# Patient Record
Sex: FEMALE | Race: WHITE | NOT HISPANIC OR LATINO | Employment: FULL TIME | URBAN - METROPOLITAN AREA
[De-identification: names, ages, dates, MRNs, and addresses within clinical notes are randomized per-mention and may not be internally consistent; named-entity substitution may affect disease eponyms.]

---

## 2023-07-13 PROBLEM — F32.A DEPRESSION: Status: ACTIVE | Noted: 2023-07-13

## 2023-07-13 PROBLEM — G43.119 INTRACTABLE MIGRAINE WITH AURA WITHOUT STATUS MIGRAINOSUS: Status: ACTIVE | Noted: 2023-07-13

## 2023-07-13 PROBLEM — K21.9 GASTROESOPHAGEAL REFLUX DISEASE: Status: ACTIVE | Noted: 2023-07-13

## 2023-07-13 PROBLEM — N94.9 VAGINAL DISCOMFORT: Status: ACTIVE | Noted: 2023-07-13

## 2023-07-13 PROBLEM — E07.9 THYROID DYSFUNCTION: Status: ACTIVE | Noted: 2023-07-13

## 2023-07-13 PROBLEM — E03.9 HYPOTHYROIDISM: Status: ACTIVE | Noted: 2023-07-13

## 2023-07-13 PROBLEM — R10.84 ABDOMINAL CRAMPING, GENERALIZED: Status: ACTIVE | Noted: 2023-07-13

## 2023-07-13 PROBLEM — K59.00 CONSTIPATION: Status: ACTIVE | Noted: 2023-07-13

## 2023-07-13 PROBLEM — N94.6 DYSMENORRHEA: Status: ACTIVE | Noted: 2023-07-13

## 2023-07-13 PROBLEM — K59.09 CHRONIC CONSTIPATION: Status: ACTIVE | Noted: 2023-07-13

## 2023-07-13 PROBLEM — B34.9 NONSPECIFIC SYNDROME SUGGESTIVE OF VIRAL ILLNESS: Status: ACTIVE | Noted: 2023-07-13

## 2023-07-13 PROBLEM — F31.9 BIPOLAR DISORDER (MULTI): Status: ACTIVE | Noted: 2019-02-05

## 2023-07-13 PROBLEM — F31.9 BIPOLAR DEPRESSION (MULTI): Status: ACTIVE | Noted: 2023-07-13

## 2023-07-13 PROBLEM — N89.8 VAGINAL DISCHARGE: Status: ACTIVE | Noted: 2023-07-13

## 2023-07-13 PROBLEM — R00.2 PALPITATIONS: Status: ACTIVE | Noted: 2023-07-13

## 2023-07-13 PROBLEM — N89.8 VAGINAL IRRITATION: Status: ACTIVE | Noted: 2023-07-13

## 2023-07-13 PROBLEM — E06.9 THYROIDITIS: Status: ACTIVE | Noted: 2023-07-13

## 2023-07-13 PROBLEM — E05.90 HYPERTHYROIDISM: Status: ACTIVE | Noted: 2023-07-13

## 2023-09-05 PROBLEM — N64.52 NIPPLE DISCHARGE IN FEMALE: Status: ACTIVE | Noted: 2023-09-05

## 2023-09-05 PROBLEM — K59.04 CHRONIC IDIOPATHIC CONSTIPATION: Status: ACTIVE | Noted: 2023-09-05

## 2023-09-05 PROBLEM — N92.6 MENSES, IRREGULAR: Status: ACTIVE | Noted: 2023-09-05

## 2023-09-05 RX ORDER — MEDROXYPROGESTERONE ACETATE 10 MG/1
1 TABLET ORAL DAILY
COMMUNITY
Start: 2023-01-25 | End: 2023-10-27 | Stop reason: ALTCHOICE

## 2023-09-05 RX ORDER — OXCARBAZEPINE 300 MG/1
1 TABLET, FILM COATED ORAL 2 TIMES DAILY
COMMUNITY
Start: 2023-07-04 | End: 2023-10-27 | Stop reason: ALTCHOICE

## 2023-09-05 RX ORDER — POLYETHYLENE GLYCOL 3350 17 G/17G
17 POWDER, FOR SOLUTION ORAL 2 TIMES DAILY
COMMUNITY
Start: 2023-01-12 | End: 2023-10-27 | Stop reason: ALTCHOICE

## 2023-09-05 RX ORDER — HYDROXYZINE PAMOATE 25 MG/1
1 CAPSULE ORAL 2 TIMES DAILY PRN
COMMUNITY
Start: 2023-03-09 | End: 2023-10-27 | Stop reason: ALTCHOICE

## 2023-09-05 RX ORDER — BUSPIRONE HYDROCHLORIDE 15 MG/1
1 TABLET ORAL 2 TIMES DAILY
COMMUNITY
End: 2023-10-27 | Stop reason: ALTCHOICE

## 2023-09-05 RX ORDER — BUPROPION HYDROCHLORIDE 150 MG/1
1 TABLET ORAL EVERY MORNING
COMMUNITY
Start: 2023-07-04 | End: 2023-10-27 | Stop reason: ALTCHOICE

## 2023-09-05 RX ORDER — ASENAPINE 10 MG/1
2 TABLET SUBLINGUAL NIGHTLY
COMMUNITY
End: 2023-10-27

## 2023-09-05 RX ORDER — LEVOTHYROXINE SODIUM 50 UG/1
1 TABLET ORAL DAILY
COMMUNITY
End: 2023-12-14 | Stop reason: SDUPTHER

## 2023-09-05 RX ORDER — LITHIUM CARBONATE 300 MG/1
2 TABLET, FILM COATED, EXTENDED RELEASE ORAL NIGHTLY
COMMUNITY
Start: 2019-02-05 | End: 2023-10-27 | Stop reason: ALTCHOICE

## 2023-09-05 RX ORDER — DOCUSATE SODIUM 50 MG AND SENNOSIDES 8.6 MG 8.6; 5 MG/1; MG/1
2 TABLET, FILM COATED ORAL NIGHTLY
COMMUNITY
Start: 2023-04-16 | End: 2023-10-27 | Stop reason: ALTCHOICE

## 2023-09-05 RX ORDER — VENLAFAXINE HYDROCHLORIDE 150 MG/1
1 CAPSULE, EXTENDED RELEASE ORAL DAILY
COMMUNITY
End: 2023-10-27 | Stop reason: ALTCHOICE

## 2023-09-05 RX ORDER — ZIPRASIDONE HYDROCHLORIDE 80 MG/1
CAPSULE ORAL
COMMUNITY
Start: 2021-09-13 | End: 2023-10-27 | Stop reason: ALTCHOICE

## 2023-09-05 RX ORDER — LEVONORGESTREL 52 MG/1
INTRAUTERINE DEVICE INTRAUTERINE
COMMUNITY
End: 2023-10-27 | Stop reason: ALTCHOICE

## 2023-09-05 RX ORDER — LEVONORGESTREL/ETHIN.ESTRADIOL 0.1-0.02MG
TABLET ORAL
COMMUNITY
End: 2023-10-27 | Stop reason: ALTCHOICE

## 2023-09-05 RX ORDER — LITHIUM CARBONATE 450 MG/1
TABLET ORAL EVERY MORNING
COMMUNITY
Start: 2019-02-05 | End: 2023-10-27 | Stop reason: WASHOUT

## 2023-10-02 ENCOUNTER — APPOINTMENT (OUTPATIENT)
Dept: PRIMARY CARE | Facility: CLINIC | Age: 23
End: 2023-10-02
Payer: MEDICAID

## 2023-10-11 ENCOUNTER — APPOINTMENT (OUTPATIENT)
Dept: PRIMARY CARE | Facility: CLINIC | Age: 23
End: 2023-10-11
Payer: MEDICAID

## 2023-10-27 ENCOUNTER — OFFICE VISIT (OUTPATIENT)
Dept: PRIMARY CARE | Facility: CLINIC | Age: 23
End: 2023-10-27
Payer: MEDICAID

## 2023-10-27 VITALS
BODY MASS INDEX: 28.56 KG/M2 | SYSTOLIC BLOOD PRESSURE: 122 MMHG | HEIGHT: 65 IN | RESPIRATION RATE: 20 BRPM | HEART RATE: 75 BPM | OXYGEN SATURATION: 99 % | WEIGHT: 171.4 LBS | DIASTOLIC BLOOD PRESSURE: 85 MMHG | TEMPERATURE: 97.4 F

## 2023-10-27 DIAGNOSIS — J30.1 HAY FEVER: ICD-10-CM

## 2023-10-27 DIAGNOSIS — Z13.220 LIPID SCREENING: ICD-10-CM

## 2023-10-27 DIAGNOSIS — M25.50 MULTIPLE JOINT PAIN: ICD-10-CM

## 2023-10-27 DIAGNOSIS — Z23 NEED FOR VACCINATION: ICD-10-CM

## 2023-10-27 DIAGNOSIS — K59.09 CHRONIC CONSTIPATION: ICD-10-CM

## 2023-10-27 DIAGNOSIS — E03.9 HYPOTHYROIDISM, UNSPECIFIED TYPE: Primary | ICD-10-CM

## 2023-10-27 DIAGNOSIS — F31.76 BIPOLAR DISORDER, IN FULL REMISSION, MOST RECENT EPISODE DEPRESSED (MULTI): ICD-10-CM

## 2023-10-27 DIAGNOSIS — Z00.00 GENERAL MEDICAL EXAM: ICD-10-CM

## 2023-10-27 DIAGNOSIS — R53.82 CHRONIC FATIGUE: ICD-10-CM

## 2023-10-27 PROBLEM — F31.9 BIPOLAR DEPRESSION (MULTI): Status: RESOLVED | Noted: 2023-07-13 | Resolved: 2023-10-27

## 2023-10-27 PROBLEM — G43.009 MIGRAINE WITHOUT AURA AND WITHOUT STATUS MIGRAINOSUS, NOT INTRACTABLE: Status: ACTIVE | Noted: 2023-10-27

## 2023-10-27 PROBLEM — G43.119 INTRACTABLE MIGRAINE WITH AURA WITHOUT STATUS MIGRAINOSUS: Status: RESOLVED | Noted: 2023-07-13 | Resolved: 2023-10-27

## 2023-10-27 PROCEDURE — 90471 IMMUNIZATION ADMIN: CPT | Performed by: NURSE PRACTITIONER

## 2023-10-27 PROCEDURE — 99395 PREV VISIT EST AGE 18-39: CPT | Performed by: NURSE PRACTITIONER

## 2023-10-27 PROCEDURE — 90686 IIV4 VACC NO PRSV 0.5 ML IM: CPT | Performed by: NURSE PRACTITIONER

## 2023-10-27 ASSESSMENT — ENCOUNTER SYMPTOMS
PALPITATIONS: 0
WEAKNESS: 0
NAUSEA: 0
HEADACHES: 0
FATIGUE: 1
CONFUSION: 0
PAIN: 1
DIZZINESS: 0
NERVOUS/ANXIOUS: 0
SLEEP DISTURBANCE: 1
COUGH: 0
ACTIVITY CHANGE: 0
SHORTNESS OF BREATH: 0
CHILLS: 0
VOMITING: 0
CONSTIPATION: 1

## 2023-10-27 NOTE — ASSESSMENT & PLAN NOTE
Stable currently   Feels like stool softener do not help  Doesn't want meds   Encouraged to increase fluid intake and fiber

## 2023-10-27 NOTE — PROGRESS NOTES
"Subjective   Patient ID: Sheri Guzmán is a 23 y.o. female who presents for New Patient Visit, Constipation, Fatigue, Pain (Generalized pain. All over body ), and Hypothyroidism.    Physical Exam    1. Tiredness/Fatigue: Some nights she does not sleep at all. Some nights she feels she rests well.   Hx of hypothyroidism. On levothyroxine     Joint Pain:  Reports shoulder, back and hip pain. Bilateral   Avoids laying on her back d/t pain   Shoulder pain has been there the longest         Constipation  Pertinent negatives include no nausea or vomiting.   Fatigue  Associated symptoms include congestion and fatigue. Pertinent negatives include no chest pain, chills, coughing, headaches, nausea, vomiting or weakness.   Pain  Associated symptoms include constipation and fatigue. Pertinent negatives include no chest pain, headaches, nausea, shortness of breath, vomiting or weakness.        Review of Systems   Constitutional:  Positive for fatigue. Negative for activity change and chills.   HENT:  Positive for congestion and sneezing.    Respiratory:  Negative for cough and shortness of breath.    Cardiovascular:  Negative for chest pain and palpitations.   Gastrointestinal:  Positive for constipation. Negative for nausea and vomiting.   Neurological:  Negative for dizziness, weakness and headaches.   Psychiatric/Behavioral:  Positive for sleep disturbance. Negative for behavioral problems, confusion and suicidal ideas. The patient is not nervous/anxious.        Objective   /85   Pulse 75   Temp 36.3 °C (97.4 °F) (Temporal)   Resp 20   Ht 1.651 m (5' 5\")   Wt 77.7 kg (171 lb 6.4 oz)   SpO2 99%   BMI 28.52 kg/m²     Physical Exam  Vitals reviewed.   Constitutional:       Appearance: Normal appearance.   HENT:      Head: Normocephalic.   Cardiovascular:      Rate and Rhythm: Normal rate and regular rhythm.      Pulses: Normal pulses.      Heart sounds: Normal heart sounds.   Pulmonary:      Effort: Pulmonary " effort is normal.      Breath sounds: Normal breath sounds.   Musculoskeletal:         General: Normal range of motion.      Cervical back: Normal range of motion.   Neurological:      General: No focal deficit present.      Mental Status: She is alert and oriented to person, place, and time.   Psychiatric:         Mood and Affect: Mood normal.         Behavior: Behavior normal.         Assessment/Plan   Problem List Items Addressed This Visit             ICD-10-CM    Bipolar disorder (CMS/HCC) F31.9     Continue psychology visits  Psychiatrist located in [facility]   Stable condition         Chronic constipation K59.09     Stable currently   Feels like stool softener do not help  Doesn't want meds   Encouraged to increase fluid intake and fiber            Hypothyroidism - Primary E03.9     Tsh ordered   Continue levothyroxine 50mcg  Follow up 1 month            Relevant Orders    Comprehensive Metabolic Panel    TSH with reflex to Free T4 if abnormal    Multiple joint pain M25.50     Labs ordered   Autoimmune eval   Follow up 1 month   Continue prn ibuprofen         Relevant Orders    Comprehensive Metabolic Panel    AMY with Reflex to JAYE    Sedimentation Rate    Rheumatoid Factor    Chronic fatigue R53.82    Relevant Orders    CBC    Comprehensive Metabolic Panel    Iron and TIBC    Ferritin    Vitamin B12    Vitamin D 1,25 Dihydroxy (for eval of hypercalcemia)    General medical exam Z00.00    Need for vaccination Z23    Relevant Orders    Flu vaccine (IIV4) age 6 months and greater, preservative free (Completed)    Hay fever J30.1     Other Visit Diagnoses         Codes    Lipid screening     Z13.220    Relevant Orders    Lipid Panel            Time Spent  Time spent directly with patient, family or caregiver: 30 minutes  Documentation Time: 10 minutes

## 2023-11-30 ENCOUNTER — OFFICE VISIT (OUTPATIENT)
Dept: GASTROENTEROLOGY | Facility: CLINIC | Age: 23
End: 2023-11-30
Payer: MEDICAID

## 2023-11-30 ENCOUNTER — LAB (OUTPATIENT)
Dept: LAB | Facility: LAB | Age: 23
End: 2023-11-30
Payer: MEDICAID

## 2023-11-30 VITALS
HEART RATE: 74 BPM | BODY MASS INDEX: 27.32 KG/M2 | HEIGHT: 66 IN | WEIGHT: 170 LBS | DIASTOLIC BLOOD PRESSURE: 80 MMHG | SYSTOLIC BLOOD PRESSURE: 130 MMHG | OXYGEN SATURATION: 97 %

## 2023-11-30 DIAGNOSIS — M25.50 MULTIPLE JOINT PAIN: ICD-10-CM

## 2023-11-30 DIAGNOSIS — R53.82 CHRONIC FATIGUE: ICD-10-CM

## 2023-11-30 DIAGNOSIS — Z13.220 LIPID SCREENING: ICD-10-CM

## 2023-11-30 DIAGNOSIS — K58.1 IRRITABLE BOWEL SYNDROME WITH CONSTIPATION: Primary | ICD-10-CM

## 2023-11-30 DIAGNOSIS — E03.9 HYPOTHYROIDISM, UNSPECIFIED TYPE: ICD-10-CM

## 2023-11-30 LAB
ALBUMIN SERPL BCP-MCNC: 4.4 G/DL (ref 3.4–5)
ALP SERPL-CCNC: 60 U/L (ref 33–110)
ALT SERPL W P-5'-P-CCNC: 17 U/L (ref 7–45)
ANION GAP SERPL CALC-SCNC: 9 MMOL/L (ref 10–20)
AST SERPL W P-5'-P-CCNC: 18 U/L (ref 9–39)
BILIRUB SERPL-MCNC: 0.7 MG/DL (ref 0–1.2)
BUN SERPL-MCNC: 6 MG/DL (ref 6–23)
CALCIUM SERPL-MCNC: 9.6 MG/DL (ref 8.6–10.3)
CHLORIDE SERPL-SCNC: 106 MMOL/L (ref 98–107)
CHOLEST SERPL-MCNC: 120 MG/DL (ref 0–199)
CHOLESTEROL/HDL RATIO: 2.1
CO2 SERPL-SCNC: 28 MMOL/L (ref 21–32)
CREAT SERPL-MCNC: 0.68 MG/DL (ref 0.5–1.05)
ERYTHROCYTE [DISTWIDTH] IN BLOOD BY AUTOMATED COUNT: 12.3 % (ref 11.5–14.5)
ERYTHROCYTE [SEDIMENTATION RATE] IN BLOOD BY WESTERGREN METHOD: <1 MM/H (ref 0–20)
FERRITIN SERPL-MCNC: 57 NG/ML (ref 8–150)
GFR SERPL CREATININE-BSD FRML MDRD: >90 ML/MIN/1.73M*2
GLUCOSE SERPL-MCNC: 84 MG/DL (ref 74–99)
HCT VFR BLD AUTO: 42.9 % (ref 36–46)
HDLC SERPL-MCNC: 57 MG/DL
HGB BLD-MCNC: 14.1 G/DL (ref 12–16)
IRON SATN MFR SERPL: 47 % (ref 25–45)
IRON SERPL-MCNC: 164 UG/DL (ref 35–150)
LDLC SERPL CALC-MCNC: 47 MG/DL
MCH RBC QN AUTO: 29 PG (ref 26–34)
MCHC RBC AUTO-ENTMCNC: 32.9 G/DL (ref 32–36)
MCV RBC AUTO: 88 FL (ref 80–100)
NON HDL CHOLESTEROL: 63 MG/DL (ref 0–149)
NRBC BLD-RTO: 0 /100 WBCS (ref 0–0)
PLATELET # BLD AUTO: 215 X10*3/UL (ref 150–450)
POTASSIUM SERPL-SCNC: 3.9 MMOL/L (ref 3.5–5.3)
PROT SERPL-MCNC: 7 G/DL (ref 6.4–8.2)
RBC # BLD AUTO: 4.87 X10*6/UL (ref 4–5.2)
RHEUMATOID FACT SER NEPH-ACNC: <10 IU/ML (ref 0–15)
SODIUM SERPL-SCNC: 139 MMOL/L (ref 136–145)
TIBC SERPL-MCNC: 347 UG/DL (ref 240–445)
TRIGL SERPL-MCNC: 82 MG/DL (ref 0–149)
TSH SERPL-ACNC: 1.35 MIU/L (ref 0.44–3.98)
UIBC SERPL-MCNC: 183 UG/DL (ref 110–370)
VIT B12 SERPL-MCNC: 359 PG/ML (ref 211–911)
VLDL: 16 MG/DL (ref 0–40)
WBC # BLD AUTO: 4.8 X10*3/UL (ref 4.4–11.3)

## 2023-11-30 PROCEDURE — 36415 COLL VENOUS BLD VENIPUNCTURE: CPT

## 2023-11-30 PROCEDURE — 86431 RHEUMATOID FACTOR QUANT: CPT

## 2023-11-30 PROCEDURE — 82607 VITAMIN B-12: CPT

## 2023-11-30 PROCEDURE — 86038 ANTINUCLEAR ANTIBODIES: CPT

## 2023-11-30 PROCEDURE — 82652 VIT D 1 25-DIHYDROXY: CPT

## 2023-11-30 PROCEDURE — 80061 LIPID PANEL: CPT

## 2023-11-30 PROCEDURE — 84443 ASSAY THYROID STIM HORMONE: CPT

## 2023-11-30 PROCEDURE — 83540 ASSAY OF IRON: CPT

## 2023-11-30 PROCEDURE — 99214 OFFICE O/P EST MOD 30 MIN: CPT | Performed by: INTERNAL MEDICINE

## 2023-11-30 PROCEDURE — 85027 COMPLETE CBC AUTOMATED: CPT

## 2023-11-30 PROCEDURE — 85652 RBC SED RATE AUTOMATED: CPT

## 2023-11-30 PROCEDURE — 4004F PT TOBACCO SCREEN RCVD TLK: CPT | Performed by: INTERNAL MEDICINE

## 2023-11-30 PROCEDURE — 80053 COMPREHEN METABOLIC PANEL: CPT

## 2023-11-30 PROCEDURE — 83550 IRON BINDING TEST: CPT

## 2023-11-30 PROCEDURE — 82728 ASSAY OF FERRITIN: CPT

## 2023-11-30 NOTE — PROGRESS NOTES
[facility]    ASSESSMENT and PLAN:       Patient is a 22-year-old female presents for evaluation of constipation     1. Idiopathic Constipation   -Patient's constipation has been ongoing since she was 12 years old and has not progressively gotten worse she denies any melena hematochezia no alarm symptoms on exam  -Patient currently not taking anything for a bowel regimen  -We will trial MiraLAX 17 g twice daily along with senna nightly for 5 days,   - we will place patient on Linzess 290 MCG's once daily samples were given in the office           Jr Coe DO         Gastroenterology    [facility]            Subjective   HISTORY OF PRESENT ILLNESS:     Chief Complaint  discuss bowel movements  (Last seen 1/12/23 for constipation , recommended miralax - did not help. Having abdominal pain. No other otc meds )    History Of Present Illness:    Patient is a 22-year-old female who presents as a self-referral for constipation. She states that she is having really bad constipation. SHe states that she will have only one bowel movement a week, This has been ongoing since she was 12. She will have to strain and take up to 30 minutes. She is curently nott aking anything. SHe has tried to increase her fiber intake. She also tried over-the-counter fiber supplementations and teas. She states that no improvement with district fiber. She has not been taking MiraLAX or senna or anything over-the-counter to help with her bowel movements.       She was taking miralax 17gm bid that stopped working     Last week she was having a lot of constipation.   '  She states that she tried the linzess without any improvement in the constipation.       She denies any blood in her stool, hematochexia, or melena.               PMH- Hypothyroid, anxiety, depression, bipolar  PSH- Denies  SOcHX-Denies Tobaccoo or IVDU nightly marijuana   FamH- Denies any CRC IBD  or GIAC       Patient denies any heartburn/GERD, N/V, dysphagia, odynophagia, abdominal pain, diarrhea, constipation, hematemesis, hematochezia, melena, or weight loss.      Endoscopy History:    None     Review of systems:   Review of Systems   All other systems reviewed and are negative.        I performed a complete 10 point review of systems and it is negative except as noted in HPI or above.        PAST HISTORIES:       Past Medical History:  She has a past medical history of Anxiety, Bipolar disorder, unspecified (CMS/HCC), Depression, Eczema (As a child), GERD (gastroesophageal reflux disease) (As a child), Headache (During high school), Inflammatory bowel disease (During middle school), Personal history of other mental and behavioral disorders, Personal history of other mental and behavioral disorders, and Unspecified speech disturbances.    Past Surgical History:  She has a past surgical history that includes [place] tooth extraction (2019).      Social History:  She reports that she has been smoking. She does not have any smokeless tobacco history on file. She reports current alcohol use of about 3.0 standard drinks of alcohol per week. She reports current drug use. Frequency: 7.00 times per week. Drug: Marijuana.    Family History:  No known GI disease, specifically denies pancreatitis, Crohn's, colon cancer, gastroesophageal cancer, or ulcerative colitis.    Family History   Problem Relation Name Age of Onset    Diabetes Maternal Grandmother Stephany Ramirez     Hypothyroidism Maternal Grandmother Stephany Ramirez     Multiple sclerosis Maternal Grandmother Stephany Ramirez     Mental illness Maternal Grandmother Stephany Ramirez     Miscarriages / Stillbirths Maternal Grandmother Stephany Ramirez     Vision loss Maternal Grandmother Stephany Ramirez     Other (Cardiac Disorder) Paternal Grandmother      Other (Cardiac Disorder) Paternal Grandfather Neil linares     Diabetes Paternal Grandfather Neil linares     Diabetes Other           "Multiple Family Members    Hypothyroidism Other      Breast cancer Other Maternal Relatives     Prostate cancer Other Maternal Relatives     Alcohol abuse Mother Marlon Guzmán     Miscarriages / Stillbirths Mother Marlon Guzmán     Breast cancer Maternal Grandfather Alessandra         Allergies:  Patient has no known allergies.        Objective   OBJECTIVE:       Last Recorded Vitals:  Vitals:    11/30/23 0800   BP: 130/80   Pulse: 74   SpO2: 97%   Weight: 77.1 kg (170 lb)   Height: 1.664 m (5' 5.5\")     /80   Pulse 74   Ht 1.664 m (5' 5.5\")   Wt 77.1 kg (170 lb)   SpO2 97%   BMI 27.86 kg/m²      Physical Exam:    Physical Exam  Vitals reviewed.   Constitutional:       General: She is awake.      Appearance: Normal appearance.   HENT:      Head: Normocephalic.      Mouth/Throat:      Mouth: Mucous membranes are moist.   Eyes:      Extraocular Movements: Extraocular movements intact.   Cardiovascular:      Rate and Rhythm: Normal rate.      Heart sounds: Normal heart sounds.   Pulmonary:      Effort: Pulmonary effort is normal.      Breath sounds: Normal breath sounds.   Abdominal:      General: Bowel sounds are normal.      Palpations: Abdomen is soft.      Tenderness: There is no abdominal tenderness. There is no guarding or rebound.      Hernia: No hernia is present.   Musculoskeletal:         General: Normal range of motion.      Cervical back: Neck supple.   Skin:     General: Skin is warm and dry.   Neurological:      General: No focal deficit present.      Mental Status: She is alert.   Psychiatric:         Attention and Perception: Attention and perception normal.         Mood and Affect: Mood normal.         Behavior: Behavior normal.           Home Medications:  Prior to Admission medications    Medication Sig Start Date End Date Taking? Authorizing Provider   levothyroxine (Synthroid, Levoxyl) 50 mcg tablet Take 1 tablet (50 mcg) by mouth once daily.    Historical Provider, MD         Relevant " "Results Recent labs reviewed in the EMR.  Lab Results   Component Value Date    HGB 14.1 11/12/2022    HGB 13.8 07/14/2022    HGB 13.3 04/17/2022    MCV 84 11/12/2022    MCV 86 07/14/2022    MCV 84 04/17/2022     11/12/2022     07/14/2022     04/17/2022       No results found for: \"FERRITIN\", \"IRON\"    Lab Results   Component Value Date     11/12/2022    K 4.0 11/12/2022     (H) 11/12/2022    BUN 7 11/12/2022    CREATININE 0.71 11/12/2022       Lab Results   Component Value Date    BILITOT 0.4 11/12/2022     Lab Results   Component Value Date    ALT 34 11/12/2022    ALT 28 07/14/2022    ALT 15 04/17/2022    AST 26 11/12/2022    AST 26 07/14/2022    AST 17 04/17/2022    ALKPHOS 83 11/12/2022    ALKPHOS 81 07/14/2022    ALKPHOS 72 04/17/2022       No results found for: \"CRP\"    No results found for: \"CALPS\"    Radiology: Reviewed imaging reviewed in the EMR.  No results found.      "

## 2023-12-01 ENCOUNTER — APPOINTMENT (OUTPATIENT)
Dept: PRIMARY CARE | Facility: CLINIC | Age: 23
End: 2023-12-01
Payer: MEDICAID

## 2023-12-02 LAB — 1,25(OH)2D3 SERPL-MCNC: 39.2 PG/ML (ref 19.9–79.3)

## 2023-12-03 LAB — ANA SER QL HEP2 SUBST: NEGATIVE

## 2023-12-14 ENCOUNTER — OFFICE VISIT (OUTPATIENT)
Dept: PRIMARY CARE | Facility: CLINIC | Age: 23
End: 2023-12-14
Payer: MEDICAID

## 2023-12-14 VITALS
DIASTOLIC BLOOD PRESSURE: 67 MMHG | OXYGEN SATURATION: 98 % | SYSTOLIC BLOOD PRESSURE: 109 MMHG | BODY MASS INDEX: 27.32 KG/M2 | RESPIRATION RATE: 20 BRPM | WEIGHT: 170 LBS | HEART RATE: 61 BPM | TEMPERATURE: 97.5 F | HEIGHT: 66 IN

## 2023-12-14 DIAGNOSIS — E03.9 HYPOTHYROIDISM, UNSPECIFIED TYPE: ICD-10-CM

## 2023-12-14 DIAGNOSIS — K59.09 CHRONIC CONSTIPATION: ICD-10-CM

## 2023-12-14 DIAGNOSIS — R51.9 NONINTRACTABLE HEADACHE, UNSPECIFIED CHRONICITY PATTERN, UNSPECIFIED HEADACHE TYPE: Primary | ICD-10-CM

## 2023-12-14 PROBLEM — K59.04 CHRONIC IDIOPATHIC CONSTIPATION: Status: RESOLVED | Noted: 2023-09-05 | Resolved: 2023-12-14

## 2023-12-14 PROBLEM — K59.00 CONSTIPATION: Status: RESOLVED | Noted: 2023-07-13 | Resolved: 2023-12-14

## 2023-12-14 PROCEDURE — 4004F PT TOBACCO SCREEN RCVD TLK: CPT | Performed by: NURSE PRACTITIONER

## 2023-12-14 PROCEDURE — 99214 OFFICE O/P EST MOD 30 MIN: CPT | Performed by: NURSE PRACTITIONER

## 2023-12-14 RX ORDER — LEVOTHYROXINE SODIUM 50 UG/1
50 TABLET ORAL DAILY
Qty: 90 TABLET | Refills: 1 | Status: SHIPPED | OUTPATIENT
Start: 2023-12-14

## 2023-12-14 ASSESSMENT — ENCOUNTER SYMPTOMS
WHEEZING: 0
ABDOMINAL PAIN: 0
COUGH: 0
SORE THROAT: 0
CHILLS: 0
SPEECH DIFFICULTY: 0
ACTIVITY CHANGE: 0
CONFUSION: 0
MYALGIAS: 0
HEADACHES: 0
SHORTNESS OF BREATH: 0
DIZZINESS: 0
PALPITATIONS: 0
VOMITING: 0
APNEA: 0
ARTHRALGIAS: 0
NAUSEA: 0
NERVOUS/ANXIOUS: 0
CONSTITUTIONAL NEGATIVE: 1
SLEEP DISTURBANCE: 0
WEAKNESS: 0
FEVER: 0

## 2023-12-14 NOTE — ASSESSMENT & PLAN NOTE
CT of head for eval   Report to ED if symptoms re-occur/ could also be concerning for seizure activity

## 2023-12-14 NOTE — ASSESSMENT & PLAN NOTE
.  Lab Results   Component Value Date    TSH 1.35 11/30/2023    Well controlled   Continue current poc   Follow up 6 months

## 2023-12-14 NOTE — PROGRESS NOTES
"Subjective   Patient ID: Sheri Guzmán is a 23 y.o. female who presents for Follow-up (Lab review ), Hypothyroidism, and Constipation.    Lab Review     1. Reporting that she gets head pain in the crown of her head, struggles with movements, trouble breathing and felt like she was smoking. Reports she laid done, wasn't able to speak or process things. She reports her arm was twitching and tongue was rotating. Lasts for approximately less than hour. Has happened only a few times in last year.   Always at night before bed. Last episode 2 days ago-worse one so far. Happened while she was on the toilet   Every episode is not associated with smoking prior.     2. Sinus drainage: Using saline spray and herbal teas/ prefers no meds   Denies fever, sore throat or ear pain     3. Constipation: Using linzess prn for management   No complaints          Review of Systems   Constitutional: Negative.  Negative for activity change, chills and fever.   HENT:  Negative for congestion, postnasal drip, sneezing and sore throat.    Respiratory:  Negative for apnea, cough, shortness of breath and wheezing.    Cardiovascular:  Negative for chest pain and palpitations.   Gastrointestinal:  Negative for abdominal pain, nausea and vomiting.   Musculoskeletal:  Negative for arthralgias and myalgias.   Neurological:  Negative for dizziness, syncope, speech difficulty, weakness and headaches.        Today all normal   See hpi for occasional episodes   Psychiatric/Behavioral:  Negative for confusion and sleep disturbance. The patient is not nervous/anxious.        Objective   /67   Pulse 61   Temp 36.4 °C (97.5 °F) (Temporal)   Resp 20   Ht 1.664 m (5' 5.5\")   Wt 77.1 kg (170 lb)   SpO2 98%   BMI 27.86 kg/m²     Physical Exam  Vitals reviewed.   Constitutional:       Appearance: Normal appearance.   HENT:      Right Ear: Tympanic membrane normal.      Left Ear: Tympanic membrane normal.      Nose: Nose normal.   Eyes:      " Pupils: Pupils are equal, round, and reactive to light.   Cardiovascular:      Rate and Rhythm: Normal rate and regular rhythm.      Pulses: Normal pulses.      Heart sounds: Normal heart sounds.   Pulmonary:      Effort: Pulmonary effort is normal.      Breath sounds: Normal breath sounds.   Neurological:      Mental Status: She is alert and oriented to person, place, and time.      Cranial Nerves: Cranial nerves 2-12 are intact.      Motor: Motor function is intact. No weakness or atrophy.      Coordination: Coordination is intact.      Gait: Gait is intact.   Psychiatric:         Mood and Affect: Mood normal.         Behavior: Behavior normal.         Assessment/Plan   Problem List Items Addressed This Visit             ICD-10-CM    Chronic constipation K59.09     Continue linzess prn   Call for worsening   Follow up 6 months          Hypothyroidism E03.9     .  Lab Results   Component Value Date    TSH 1.35 11/30/2023    Well controlled   Continue current poc   Follow up 6 months         Relevant Medications    levothyroxine (Synthroid, Levoxyl) 50 mcg tablet    Other Relevant Orders    TSH with reflex to Free T4 if abnormal    Nonintractable headache - Primary R51.9     CT of head for eval   Report to ED if symptoms re-occur/ could also be concerning for seizure activity            Relevant Orders    CT head wo IV contrast       Time Spent  Time spent directly with patient, family or caregiver: 20 minutes  Documentation Time: 10 minutes

## 2023-12-15 ENCOUNTER — ANCILLARY PROCEDURE (OUTPATIENT)
Dept: RADIOLOGY | Facility: CLINIC | Age: 23
End: 2023-12-15
Payer: MEDICAID

## 2023-12-15 DIAGNOSIS — R51.9 NONINTRACTABLE HEADACHE, UNSPECIFIED CHRONICITY PATTERN, UNSPECIFIED HEADACHE TYPE: ICD-10-CM

## 2023-12-15 PROCEDURE — 70450 CT HEAD/BRAIN W/O DYE: CPT

## 2023-12-15 PROCEDURE — 70450 CT HEAD/BRAIN W/O DYE: CPT | Performed by: RADIOLOGY

## 2023-12-27 NOTE — RESULT ENCOUNTER NOTE
Labs have been reviewed.  The thyroid level is normal.  Please continue the same dose of levothyroxine.

## 2024-06-06 ENCOUNTER — HOSPITAL ENCOUNTER (EMERGENCY)
Facility: HOSPITAL | Age: 24
Discharge: HOME | End: 2024-06-06
Attending: STUDENT IN AN ORGANIZED HEALTH CARE EDUCATION/TRAINING PROGRAM
Payer: MEDICAID

## 2024-06-06 ENCOUNTER — PHARMACY VISIT (OUTPATIENT)
Dept: PHARMACY | Facility: CLINIC | Age: 24
End: 2024-06-06
Payer: MEDICAID

## 2024-06-06 VITALS
OXYGEN SATURATION: 98 % | HEIGHT: 64 IN | BODY MASS INDEX: 28.17 KG/M2 | TEMPERATURE: 97.7 F | DIASTOLIC BLOOD PRESSURE: 73 MMHG | RESPIRATION RATE: 18 BRPM | WEIGHT: 165 LBS | SYSTOLIC BLOOD PRESSURE: 111 MMHG | HEART RATE: 103 BPM

## 2024-06-06 DIAGNOSIS — H66.90 ACUTE OTITIS MEDIA, UNSPECIFIED OTITIS MEDIA TYPE: Primary | ICD-10-CM

## 2024-06-06 DIAGNOSIS — H60.501 ACUTE OTITIS EXTERNA OF RIGHT EAR, UNSPECIFIED TYPE: ICD-10-CM

## 2024-06-06 PROCEDURE — RXMED WILLOW AMBULATORY MEDICATION CHARGE

## 2024-06-06 PROCEDURE — 99283 EMERGENCY DEPT VISIT LOW MDM: CPT

## 2024-06-06 RX ORDER — AMOXICILLIN 500 MG/1
500 TABLET, FILM COATED ORAL EVERY 8 HOURS SCHEDULED
Qty: 30 TABLET | Refills: 0 | Status: SHIPPED | OUTPATIENT
Start: 2024-06-06 | End: 2024-06-16

## 2024-06-06 RX ORDER — GUAIFENESIN AND PSEUDOEPHEDRINE HCL 1200; 120 MG/1; MG/1
1 TABLET, EXTENDED RELEASE ORAL 2 TIMES DAILY
Qty: 24 TABLET | Refills: 0 | Status: SHIPPED | OUTPATIENT
Start: 2024-06-06

## 2024-06-06 RX ORDER — NEOMYCIN SULFATE, POLYMYXIN B SULFATE AND HYDROCORTISONE 10; 3.5; 1 MG/ML; MG/ML; [USP'U]/ML
4 SUSPENSION/ DROPS AURICULAR (OTIC) 4 TIMES DAILY
Qty: 10 ML | Refills: 0 | Status: SHIPPED | OUTPATIENT
Start: 2024-06-06 | End: 2024-06-19

## 2024-06-06 ASSESSMENT — COLUMBIA-SUICIDE SEVERITY RATING SCALE - C-SSRS
6. HAVE YOU EVER DONE ANYTHING, STARTED TO DO ANYTHING, OR PREPARED TO DO ANYTHING TO END YOUR LIFE?: NO
2. HAVE YOU ACTUALLY HAD ANY THOUGHTS OF KILLING YOURSELF?: NO
1. IN THE PAST MONTH, HAVE YOU WISHED YOU WERE DEAD OR WISHED YOU COULD GO TO SLEEP AND NOT WAKE UP?: NO

## 2024-06-06 ASSESSMENT — PAIN - FUNCTIONAL ASSESSMENT: PAIN_FUNCTIONAL_ASSESSMENT: 0-10

## 2024-06-06 ASSESSMENT — PAIN DESCRIPTION - PAIN TYPE: TYPE: ACUTE PAIN

## 2024-06-06 ASSESSMENT — PAIN DESCRIPTION - LOCATION: LOCATION: EAR

## 2024-06-06 ASSESSMENT — PAIN SCALES - GENERAL: PAINLEVEL_OUTOF10: 4

## 2024-06-06 NOTE — ED PROVIDER NOTES
No chief complaint on file.      HPI       23 year old female presents to the Emergency Department today complaining of a 1-2 week history of nasal congestion, postnasal drip, and nonproductive cough. Developed right ear pain over the past week. Denies any associated fever, chills, headache, neck pain, chest pain, shortness of breath, abdominal pain, nausea, vomiting, diarrhea, constipation, or urinary symptoms.       History provided by:  Patient             Patient History   Past Medical History:   Diagnosis Date    Anxiety     Bipolar disorder, unspecified (Multi)     Bipolar disease, chronic    Depression     Eczema As a child    GERD (gastroesophageal reflux disease) As a child    Headache During high school    Inflammatory bowel disease During middle school    Personal history of other mental and behavioral disorders     History of posttraumatic stress disorder (PTSD)    Personal history of other mental and behavioral disorders     History of anxiety    Unspecified speech disturbances     Speech impediment     Past Surgical History:   Procedure Laterality Date    [place] TOOTH EXTRACTION  2019     Family History   Problem Relation Name Age of Onset    Diabetes Maternal Grandmother Stpehany Ramirez     Hypothyroidism Maternal Grandmother Stephany Ramirez     Multiple sclerosis Maternal Grandmother Stephany Ramirez     Mental illness Maternal Grandmother Stephany Ramirez     Miscarriages / Stillbirths Maternal Grandmother Stephany Ramirez     Vision loss Maternal Grandmother Stephayn Ramirez     Other (Cardiac Disorder) Paternal Grandmother      Other (Cardiac Disorder) Paternal Grandfather Trejo linares     Diabetes Paternal Grandfather Trejo linares     Diabetes Other          Multiple Family Members    Hypothyroidism Other      Breast cancer Other Maternal Relatives     Prostate cancer Other Maternal Relatives     Alcohol abuse Mother Marlon Ramirez     Miscarriages / Stillbirths Mother Marlon Ramirez     Breast cancer Maternal Grandfather  Alessandra      Social History     Tobacco Use    Smoking status: Every Day    Smokeless tobacco: Not on file    Tobacco comments:     Plan to discontinue use in the future. No plans at this moment.   Substance Use Topics    Alcohol use: Yes     Alcohol/week: 3.0 standard drinks of alcohol     Types: 3 Glasses of wine per week     Comment: On occasion    Drug use: Yes     Frequency: 7.0 times per week     Types: Marijuana     Comment: I have a medical marijuana card           Physical Exam  Constitutional:       Appearance: Normal appearance.   HENT:      Head: Normocephalic.      Right Ear: External ear normal. A middle ear effusion is present. Tympanic membrane is injected, erythematous and bulging.      Left Ear: Tympanic membrane, ear canal and external ear normal.      Ears:      Comments: Pain with pinna pull and tragus tenderness noted.      Nose: Nose normal.      Mouth/Throat:      Lips: Pink.      Mouth: Mucous membranes are moist.      Dentition: No dental caries.      Pharynx: Oropharynx is clear. Uvula midline. No oropharyngeal exudate or posterior oropharyngeal erythema.      Tonsils: No tonsillar exudate. 1+ on the right. 1+ on the left.   Eyes:      Conjunctiva/sclera: Conjunctivae normal.      Pupils: Pupils are equal, round, and reactive to light.   Cardiovascular:      Rate and Rhythm: Normal rate and regular rhythm.      Heart sounds: No murmur heard.     No friction rub. No gallop.   Pulmonary:      Effort: Pulmonary effort is normal. No respiratory distress.      Breath sounds: Normal breath sounds. No stridor. No wheezing, rhonchi or rales.   Neurological:      Mental Status: She is alert.         Labs Reviewed - No data to display    No orders to display            ED Course & MDM   Diagnoses as of 06/06/24 1131   Acute otitis media, unspecified otitis media type   Acute otitis externa of right ear, unspecified type           Medical Decision Making  Patient was seen and evaluated by myself.  Noted to have an otitis media and externa. Given prescriptions for Cortisporin ear suspension, Mucinex + D, and Amoxicillin. Follow up with their doctor in 4 days. Return if worse in any way. Discharged in stable condition with computer instructions.    Diagnostic Impression:     1. Acute right otitis media and externa    2. Prescription therapy            Your medication list        ASK your doctor about these medications        Instructions Last Dose Given Next Dose Due   levothyroxine 50 mcg tablet  Commonly known as: Synthroid, Levoxyl      Take 1 tablet (50 mcg) by mouth once daily.       linaCLOtide 290 mcg capsule  Commonly known as: Linzess      Take 1 capsule (290 mcg) by mouth once daily in the morning. Take before meals. Do not crush or chew.                  Procedure  Procedures     Popeye Garcia, BRONWYN-CNP  06/06/24 113

## 2024-06-10 ENCOUNTER — APPOINTMENT (OUTPATIENT)
Dept: PRIMARY CARE | Facility: CLINIC | Age: 24
End: 2024-06-10
Payer: MEDICAID

## 2024-08-15 ENCOUNTER — OFFICE VISIT (OUTPATIENT)
Dept: OBSTETRICS AND GYNECOLOGY | Facility: CLINIC | Age: 24
End: 2024-08-15
Payer: MEDICAID

## 2024-08-15 VITALS — BODY MASS INDEX: 26.46 KG/M2 | HEIGHT: 64 IN | WEIGHT: 155 LBS

## 2024-08-15 DIAGNOSIS — N89.8 VAGINAL ITCHING: Primary | ICD-10-CM

## 2024-08-15 DIAGNOSIS — N89.8 VAGINAL LESION: ICD-10-CM

## 2024-08-15 PROCEDURE — 3008F BODY MASS INDEX DOCD: CPT | Performed by: NURSE PRACTITIONER

## 2024-08-15 PROCEDURE — 99203 OFFICE O/P NEW LOW 30 MIN: CPT | Performed by: NURSE PRACTITIONER

## 2024-08-15 PROCEDURE — 87529 HSV DNA AMP PROBE: CPT

## 2024-08-15 RX ORDER — CLOTRIMAZOLE AND BETAMETHASONE DIPROPIONATE 10; .64 MG/G; MG/G
1 CREAM TOPICAL 2 TIMES DAILY
Qty: 45 G | Refills: 2 | Status: SHIPPED | OUTPATIENT
Start: 2024-08-15 | End: 2024-09-12

## 2024-08-15 RX ORDER — OXCARBAZEPINE 150 MG/1
150 TABLET, FILM COATED ORAL 2 TIMES DAILY
COMMUNITY

## 2024-08-15 RX ORDER — BUPROPION HYDROCHLORIDE 150 MG/1
150 TABLET ORAL
COMMUNITY

## 2024-08-15 ASSESSMENT — ENCOUNTER SYMPTOMS
BACK PAIN: 1
FREQUENCY: 1
ABDOMINAL PAIN: 1
FLANK PAIN: 1
DYSURIA: 0
SORE THROAT: 0
VOMITING: 0
ANOREXIA: 1
DIARRHEA: 1
NAUSEA: 1
CONSTIPATION: 1
FEVER: 0
CHILLS: 0
HEMATURIA: 0
HEADACHES: 1

## 2024-08-15 NOTE — PROGRESS NOTES
Subjective   Patient ID: Sheri Guzmán is a 24 y.o. female who presents for Infection (Patient complains of a lot of vaginal discomfort, irritation and external vaginal itching that started months ago. [facility] /Mirena was inserted 2/7/2023).  24 year old here for vaginal itching.  She notes that her symptoms have been going for about 6 months.  The symptoms will improve slightly and then return.  Hwever she has never gone more than 12 hours without itching.  She mostly notices the itching at night time.  She feels the itching along the mabia majora and more exterior.  She denies any open sores or broken skin.  She has pain when urine hits the area, but no pain from the urine.  She also dneies any bleeding, vaginal discharge.  She has had std testing and checks [facility] and all results were normal.  She also has noticed the area is more swollen with heat and more tender at certain times.  She feels it will actually hurt if she does not itch when she has the sensation.     Female  Problem  The patient's primary symptoms include genital itching, a genital odor, pelvic pain and vaginal discharge. The patient's pertinent negatives include no genital lesions, genital rash, missed menses or vaginal bleeding. This is a new problem. The current episode started more than 1 month ago. The problem occurs constantly. The problem has been waxing and waning. The pain is mild. The problem affects both sides. She is not pregnant. Associated symptoms include abdominal pain, anorexia, back pain, constipation, diarrhea, discolored urine, flank pain, frequency, headaches, joint swelling, nausea, painful intercourse and urgency. Pertinent negatives include no chills, dysuria, fever, hematuria, joint pain, rash, sore throat or vomiting. The vaginal discharge was malodorous, milky, mucoid, scant and white. She has not been passing clots. She  has not been passing tissue. The symptoms are aggravated by intercourse, tactile pressure and urinating. She is sexually active. No, her partner does not have an STD. She uses an IUD for contraception. Her menstrual history has been regular.       Review of Systems   Constitutional:  Negative for chills and fever.   HENT:  Negative for sore throat.    Gastrointestinal:  Positive for abdominal pain, anorexia, constipation, diarrhea and nausea. Negative for vomiting.   Genitourinary:  Positive for flank pain, frequency, pelvic pain, urgency and vaginal discharge. Negative for dysuria, hematuria and missed menses.   Musculoskeletal:  Positive for back pain. Negative for joint pain.   Skin:  Negative for rash.   Neurological:  Positive for headaches.       Objective   Physical Exam  Vitals reviewed.   Constitutional:       Appearance: Normal appearance. She is well-developed.   Pulmonary:      Effort: Pulmonary effort is normal. No respiratory distress.   Chest:   Breasts:     Breasts are symmetrical.      Right: Normal. No swelling, bleeding, inverted nipple, mass, nipple discharge, skin change or tenderness.      Left: Normal. No swelling, bleeding, inverted nipple, mass, nipple discharge, skin change or tenderness.   Abdominal:      Palpations: Abdomen is soft.   Genitourinary:     General: Normal vulva.      Exam position: Lithotomy position.      Pubic Area: No rash.       Labia:         Right: Lesion present. No rash, tenderness or injury.         Left: Lesion present. No rash, tenderness or injury.       Urethra: No prolapse, urethral pain, urethral swelling or urethral lesion.      Vagina: Vaginal discharge present.      Cervix: Normal.      Uterus: Normal.       Adnexa: Right adnexa normal and left adnexa normal.      Rectum: Normal.       Musculoskeletal:         General: Normal range of motion.   Lymphadenopathy:      Upper Body:      Right upper body: No supraclavicular, axillary or pectoral adenopathy.       Left upper body: No supraclavicular, axillary or pectoral adenopathy.   Skin:     General: Skin is warm and dry.   Neurological:      General: No focal deficit present.      Mental Status: She is alert and oriented to person, place, and time. Mental status is at baseline.   Psychiatric:         Attention and Perception: Attention and perception normal.         Mood and Affect: Mood and affect normal.         Speech: Speech normal.         Behavior: Behavior normal. Behavior is cooperative.         Thought Content: Thought content normal.         Judgment: Judgment normal.         Assessment/Plan   Problem List Items Addressed This Visit             ICD-10-CM    Vaginal itching - Primary N89.8    Relevant Medications    clotrimazole-betamethasone (Lotrisone) cream   Health track culture sent  HSV culture sent  Encouraged vulvacare  If no improvement will refer to physician  Follow up as needed       BRONWYN Coats-CNP 08/15/24 2:55 PM

## 2024-08-16 LAB
HSV1 DNA SKIN QL NAA+PROBE: NOT DETECTED
HSV2 DNA SKIN QL NAA+PROBE: NOT DETECTED

## 2024-08-29 DIAGNOSIS — Z22.322 MRSA (METHICILLIN RESISTANT STAPH AUREUS) CULTURE POSITIVE: Primary | ICD-10-CM

## 2024-08-29 DIAGNOSIS — N34.1 NONGONOCOCCAL URETHRITIS DUE TO UREAPLASMA UREALYTICUM: ICD-10-CM

## 2024-08-29 DIAGNOSIS — T36.95XA ANTIBIOTICS CAUSING ADVERSE EFFECT IN THERAPEUTIC USE, INITIAL ENCOUNTER: ICD-10-CM

## 2024-08-29 DIAGNOSIS — A49.3 NONGONOCOCCAL URETHRITIS DUE TO UREAPLASMA UREALYTICUM: ICD-10-CM

## 2024-08-29 RX ORDER — FLUCONAZOLE 150 MG/1
150 TABLET ORAL
Qty: 2 TABLET | Refills: 1 | Status: SHIPPED | OUTPATIENT
Start: 2024-08-29 | End: 2024-09-02

## 2024-08-29 RX ORDER — CIPROFLOXACIN 500 MG/1
500 TABLET ORAL 2 TIMES DAILY
Qty: 10 TABLET | Refills: 0 | Status: SHIPPED | OUTPATIENT
Start: 2024-08-29 | End: 2024-09-03

## 2024-08-29 RX ORDER — SULFAMETHOXAZOLE AND TRIMETHOPRIM 800; 160 MG/1; MG/1
1 TABLET ORAL 2 TIMES DAILY
Qty: 14 TABLET | Refills: 0 | Status: SHIPPED | OUTPATIENT
Start: 2024-08-29 | End: 2024-09-05

## 2024-10-29 ENCOUNTER — APPOINTMENT (OUTPATIENT)
Dept: PRIMARY CARE | Facility: CLINIC | Age: 24
End: 2024-10-29
Payer: MEDICAID

## 2025-01-06 ENCOUNTER — APPOINTMENT (OUTPATIENT)
Dept: PRIMARY CARE | Facility: CLINIC | Age: 25
End: 2025-01-06
Payer: MEDICAID

## 2025-01-07 ENCOUNTER — OFFICE VISIT (OUTPATIENT)
Dept: PRIMARY CARE | Facility: CLINIC | Age: 25
End: 2025-01-07
Payer: MEDICAID

## 2025-01-07 ENCOUNTER — LAB (OUTPATIENT)
Dept: LAB | Facility: LAB | Age: 25
End: 2025-01-07
Payer: MEDICAID

## 2025-01-07 VITALS
OXYGEN SATURATION: 99 % | RESPIRATION RATE: 18 BRPM | HEART RATE: 74 BPM | TEMPERATURE: 98.4 F | SYSTOLIC BLOOD PRESSURE: 115 MMHG | BODY MASS INDEX: 25.99 KG/M2 | DIASTOLIC BLOOD PRESSURE: 73 MMHG | HEIGHT: 64 IN | WEIGHT: 152.2 LBS

## 2025-01-07 DIAGNOSIS — Z13.220 SCREENING, LIPID: ICD-10-CM

## 2025-01-07 DIAGNOSIS — I48.3 TYPICAL ATRIAL FLUTTER (MULTI): ICD-10-CM

## 2025-01-07 DIAGNOSIS — E83.19 IRON OVERLOAD: ICD-10-CM

## 2025-01-07 DIAGNOSIS — I48.3 TYPICAL ATRIAL FLUTTER (MULTI): Primary | ICD-10-CM

## 2025-01-07 DIAGNOSIS — R00.2 PALPITATIONS: ICD-10-CM

## 2025-01-07 DIAGNOSIS — R07.89 CHEST TIGHTNESS: ICD-10-CM

## 2025-01-07 LAB
ALBUMIN SERPL BCP-MCNC: 4.6 G/DL (ref 3.4–5)
ALP SERPL-CCNC: 58 U/L (ref 33–110)
ALT SERPL W P-5'-P-CCNC: 17 U/L (ref 7–45)
ANION GAP SERPL CALC-SCNC: 9 MMOL/L (ref 10–20)
AST SERPL W P-5'-P-CCNC: 19 U/L (ref 9–39)
BASOPHILS # BLD AUTO: 0.04 X10*3/UL (ref 0–0.1)
BASOPHILS NFR BLD AUTO: 0.7 %
BILIRUB SERPL-MCNC: 0.6 MG/DL (ref 0–1.2)
BUN SERPL-MCNC: 9 MG/DL (ref 6–23)
CALCIUM SERPL-MCNC: 9.5 MG/DL (ref 8.6–10.3)
CHLORIDE SERPL-SCNC: 105 MMOL/L (ref 98–107)
CHOLEST SERPL-MCNC: 127 MG/DL (ref 0–199)
CHOLESTEROL/HDL RATIO: 1.8
CO2 SERPL-SCNC: 27 MMOL/L (ref 21–32)
CREAT SERPL-MCNC: 0.75 MG/DL (ref 0.5–1.05)
EGFRCR SERPLBLD CKD-EPI 2021: >90 ML/MIN/1.73M*2
EOSINOPHIL # BLD AUTO: 0.15 X10*3/UL (ref 0–0.7)
EOSINOPHIL NFR BLD AUTO: 2.6 %
ERYTHROCYTE [DISTWIDTH] IN BLOOD BY AUTOMATED COUNT: 12.7 % (ref 11.5–14.5)
FERRITIN SERPL-MCNC: 77 NG/ML (ref 8–150)
GLUCOSE SERPL-MCNC: 92 MG/DL (ref 74–99)
HCT VFR BLD AUTO: 43.9 % (ref 36–46)
HDLC SERPL-MCNC: 68.7 MG/DL
HGB BLD-MCNC: 14.4 G/DL (ref 12–16)
IMM GRANULOCYTES # BLD AUTO: 0.01 X10*3/UL (ref 0–0.7)
IMM GRANULOCYTES NFR BLD AUTO: 0.2 % (ref 0–0.9)
IRON SATN MFR SERPL: 41 % (ref 25–45)
IRON SERPL-MCNC: 140 UG/DL (ref 35–150)
LDLC SERPL CALC-MCNC: 44 MG/DL
LYMPHOCYTES # BLD AUTO: 1.38 X10*3/UL (ref 1.2–4.8)
LYMPHOCYTES NFR BLD AUTO: 23.8 %
MCH RBC QN AUTO: 28.9 PG (ref 26–34)
MCHC RBC AUTO-ENTMCNC: 32.8 G/DL (ref 32–36)
MCV RBC AUTO: 88 FL (ref 80–100)
MONOCYTES # BLD AUTO: 0.34 X10*3/UL (ref 0.1–1)
MONOCYTES NFR BLD AUTO: 5.9 %
NEUTROPHILS # BLD AUTO: 3.87 X10*3/UL (ref 1.2–7.7)
NEUTROPHILS NFR BLD AUTO: 66.8 %
NON HDL CHOLESTEROL: 58 MG/DL (ref 0–149)
NRBC BLD-RTO: 0 /100 WBCS (ref 0–0)
PLATELET # BLD AUTO: 205 X10*3/UL (ref 150–450)
POTASSIUM SERPL-SCNC: 4 MMOL/L (ref 3.5–5.3)
PROT SERPL-MCNC: 6.9 G/DL (ref 6.4–8.2)
RBC # BLD AUTO: 4.98 X10*6/UL (ref 4–5.2)
SODIUM SERPL-SCNC: 137 MMOL/L (ref 136–145)
TIBC SERPL-MCNC: 344 UG/DL (ref 240–445)
TRIGL SERPL-MCNC: 72 MG/DL (ref 0–149)
TSH SERPL-ACNC: 2.79 MIU/L (ref 0.44–3.98)
UIBC SERPL-MCNC: 204 UG/DL (ref 110–370)
VIT B12 SERPL-MCNC: 596 PG/ML (ref 211–911)
VLDL: 14 MG/DL (ref 0–40)
WBC # BLD AUTO: 5.8 X10*3/UL (ref 4.4–11.3)

## 2025-01-07 PROCEDURE — 99215 OFFICE O/P EST HI 40 MIN: CPT | Performed by: NURSE PRACTITIONER

## 2025-01-07 PROCEDURE — 80053 COMPREHEN METABOLIC PANEL: CPT

## 2025-01-07 PROCEDURE — 85025 COMPLETE CBC W/AUTO DIFF WBC: CPT

## 2025-01-07 PROCEDURE — 93000 ELECTROCARDIOGRAM COMPLETE: CPT | Performed by: NURSE PRACTITIONER

## 2025-01-07 PROCEDURE — 4004F PT TOBACCO SCREEN RCVD TLK: CPT | Performed by: NURSE PRACTITIONER

## 2025-01-07 PROCEDURE — 84443 ASSAY THYROID STIM HORMONE: CPT

## 2025-01-07 PROCEDURE — 83550 IRON BINDING TEST: CPT

## 2025-01-07 PROCEDURE — 82607 VITAMIN B-12: CPT

## 2025-01-07 PROCEDURE — 80061 LIPID PANEL: CPT

## 2025-01-07 PROCEDURE — 3008F BODY MASS INDEX DOCD: CPT | Performed by: NURSE PRACTITIONER

## 2025-01-07 PROCEDURE — 82728 ASSAY OF FERRITIN: CPT

## 2025-01-07 PROCEDURE — 83540 ASSAY OF IRON: CPT

## 2025-01-07 RX ORDER — BISMUTH SUBSALICYLATE 262 MG
1 TABLET,CHEWABLE ORAL DAILY
COMMUNITY

## 2025-01-07 ASSESSMENT — ENCOUNTER SYMPTOMS
CHEST TIGHTNESS: 1
SHORTNESS OF BREATH: 1
VOMITING: 0
NAUSEA: 0
WEAKNESS: 0
PALPITATIONS: 0
COUGH: 0
FATIGUE: 1
DIZZINESS: 0
CHILLS: 0
ABDOMINAL PAIN: 0
APNEA: 0
HEADACHES: 0
CONFUSION: 0
NERVOUS/ANXIOUS: 0
FEVER: 0
ACTIVITY CHANGE: 0

## 2025-01-07 NOTE — ASSESSMENT & PLAN NOTE
Discussed with cardiology/Cruz.  Recommend echocardiogram and Holter monitor for 7 days  Letter to be off 7 days from work / Rest  Follow up in office 6 weeks

## 2025-01-07 NOTE — LETTER
January 7, 2025     Patient: Sheri Guzmán   YOB: 2000   Date of Visit: 1/7/2025       To Whom It May Concern:    Sheri Guzmán was seen in my clinic on 1/7/2025 at 10:20 am. Please excuse Sheri for her absence from work from 1/4/25 through 1/11/25 due to a health concerns. Patient is to return to work on 1/12/25.     If you have any questions or concerns, please don't hesitate to call.         Sincerely,         Yesenia Kramer, BRONWYN-CNP

## 2025-01-07 NOTE — PROGRESS NOTES
"Subjective   Patient ID: Sheri Guzmán is a 24 y.o. female who presents for Shortness of Breath and Chest Pain.    Shortness of Breath  Pertinent negatives include no abdominal pain, chest pain, fever, headaches or vomiting.        Review of Systems   Constitutional:  Positive for fatigue. Negative for activity change, chills and fever.   Respiratory:  Positive for chest tightness and shortness of breath. Negative for apnea and cough.    Cardiovascular:  Negative for chest pain and palpitations.   Gastrointestinal:  Negative for abdominal pain, nausea and vomiting.   Neurological:  Negative for dizziness, weakness and headaches.   Psychiatric/Behavioral:  Negative for confusion. The patient is not nervous/anxious.        Objective   /73   Pulse 74   Temp 36.9 °C (98.4 °F) (Temporal)   Resp 18   Ht 1.626 m (5' 4\")   Wt 69 kg (152 lb 3.2 oz)   SpO2 99%   BMI 26.13 kg/m²     Physical Exam  Vitals reviewed.   Constitutional:       Appearance: Normal appearance.   HENT:      Head: Normocephalic.   Cardiovascular:      Rate and Rhythm: Normal rate and regular rhythm.      Pulses: Normal pulses.      Heart sounds: Normal heart sounds.   Pulmonary:      Effort: Pulmonary effort is normal. No respiratory distress.      Breath sounds: Normal breath sounds. No wheezing.   Abdominal:      General: Bowel sounds are normal.   Neurological:      General: No focal deficit present.      Mental Status: She is alert and oriented to person, place, and time.   Psychiatric:         Mood and Affect: Mood normal.         Behavior: Behavior normal.         Assessment/Plan   Problem List Items Addressed This Visit             ICD-10-CM    Palpitations R00.2     7-day Holter had side discussion with Dr. Cruz  In regards to EKG in office showing a flutter.  Chest tightness, associated shortness of breath, fatigue for many years with intermittent episodes.  Recommend having echocardiogram, Holter monitor for 7 days and hold on " referral until  results.         Relevant Orders    Holter Or Event Cardiac Monitor    ECG 12 lead (Clinic Performed) (Completed)    Typical atrial flutter (Multi) - Primary I48.3     Discussed with cardiology/Anthony.  Recommend echocardiogram and Holter monitor for 7 days  Letter to be off 7 days from work / Rest  Follow up in office 6 weeks          Relevant Orders    Transthoracic Echo (TTE) Complete    Holter Or Event Cardiac Monitor    CBC and Auto Differential    Comprehensive Metabolic Panel    TSH with reflex to Free T4 if abnormal    Vitamin B12    Chest tightness R07.89    Relevant Orders    ECG 12 lead (Clinic Performed) (Completed)    Iron overload E83.19     Needs iron recheck from last year.  Elevated at 164.  Did not keep follow-up         Relevant Orders    Iron and TIBC    Ferritin     Other Visit Diagnoses         Codes    Screening, lipid     Z13.220    Relevant Orders    Lipid Panel

## 2025-01-07 NOTE — ASSESSMENT & PLAN NOTE
7-day Holter had side discussion with Dr. Cruz  In regards to EKG in office showing a flutter.  Chest tightness, associated shortness of breath, fatigue for many years with intermittent episodes.  Recommend having echocardiogram, Holter monitor for 7 days and hold on referral until  results.

## 2025-01-13 ENCOUNTER — HOSPITAL ENCOUNTER (OUTPATIENT)
Dept: CARDIOLOGY | Facility: CLINIC | Age: 25
Discharge: HOME | End: 2025-01-13
Payer: MEDICAID

## 2025-01-13 DIAGNOSIS — I48.3 TYPICAL ATRIAL FLUTTER (MULTI): ICD-10-CM

## 2025-01-13 DIAGNOSIS — R53.83 OTHER FATIGUE: ICD-10-CM

## 2025-01-13 DIAGNOSIS — R07.9 CHEST PAIN, UNSPECIFIED: ICD-10-CM

## 2025-01-13 DIAGNOSIS — I48.92 UNSPECIFIED ATRIAL FLUTTER (MULTI): ICD-10-CM

## 2025-01-13 DIAGNOSIS — R06.00 DYSPNEA, UNSPECIFIED: ICD-10-CM

## 2025-01-13 DIAGNOSIS — R00.2 PALPITATIONS: ICD-10-CM

## 2025-01-13 PROCEDURE — 93306 TTE W/DOPPLER COMPLETE: CPT | Performed by: INTERNAL MEDICINE

## 2025-01-13 PROCEDURE — 93306 TTE W/DOPPLER COMPLETE: CPT

## 2025-01-13 PROCEDURE — 93242 EXT ECG>48HR<7D RECORDING: CPT

## 2025-01-14 LAB
AORTIC VALVE PEAK VELOCITY: 1.26 M/S
AV PEAK GRADIENT: 6 MMHG
AVA (PEAK VEL): 2.4 CM2
EJECTION FRACTION APICAL 4 CHAMBER: 67.3
EJECTION FRACTION: 60 %
LEFT ATRIUM VOLUME AREA LENGTH INDEX BSA: 26.2 ML/M2
LEFT VENTRICLE INTERNAL DIMENSION DIASTOLE: 4.9 CM (ref 3.5–6)
LEFT VENTRICULAR OUTFLOW TRACT DIAMETER: 1.93 CM
MITRAL VALVE E/A RATIO: 1.64
RIGHT VENTRICLE FREE WALL PEAK S': 13 CM/S
RIGHT VENTRICLE PEAK SYSTOLIC PRESSURE: 22.6 MMHG
TRICUSPID ANNULAR PLANE SYSTOLIC EXCURSION: 2 CM

## 2025-01-30 ENCOUNTER — APPOINTMENT (OUTPATIENT)
Dept: PRIMARY CARE | Facility: CLINIC | Age: 25
End: 2025-01-30
Payer: MEDICAID

## 2025-02-06 ENCOUNTER — TELEPHONE (OUTPATIENT)
Dept: PRIMARY CARE | Facility: CLINIC | Age: 25
End: 2025-02-06
Payer: MEDICAID

## 2025-02-06 DIAGNOSIS — R07.89 CHEST TIGHTNESS: ICD-10-CM

## 2025-02-06 DIAGNOSIS — R00.2 PALPITATIONS: ICD-10-CM

## 2025-02-06 DIAGNOSIS — I48.3 TYPICAL ATRIAL FLUTTER (MULTI): ICD-10-CM

## 2025-02-06 NOTE — TELEPHONE ENCOUNTER
Called patient and notified her that we received her disability forms from [facility] [employer] for her employer. Patient states she has been off of work since 1/8/25 due her inability to drive due to her symptoms. The patient was instructed at her office visit that she will be given 7 days of leave due to her symptoms. The patient failed to notify us of the extended days of leave.     I let her know that Yesenia Kramer is going to refer her to cardiology due to her ongoing symptoms of lightheadedness and chest tightness. I also let her know that Yesenia Kramer is going to put an order for occupation therapy so she can complete a function capacity evaluation regarding returning to work and any limitations/restrictions.   Patient is scheduled to follow up on Holter and ECHO results on 2/18/25.

## 2025-02-10 ENCOUNTER — TELEPHONE (OUTPATIENT)
Dept: CARDIOLOGY | Facility: CLINIC | Age: 25
End: 2025-02-10

## 2025-02-18 ENCOUNTER — APPOINTMENT (OUTPATIENT)
Dept: PRIMARY CARE | Facility: CLINIC | Age: 25
End: 2025-02-18
Payer: MEDICAID

## 2025-02-18 VITALS
HEIGHT: 64 IN | HEART RATE: 85 BPM | WEIGHT: 153.2 LBS | BODY MASS INDEX: 26.15 KG/M2 | TEMPERATURE: 97.4 F | SYSTOLIC BLOOD PRESSURE: 120 MMHG | DIASTOLIC BLOOD PRESSURE: 80 MMHG | OXYGEN SATURATION: 99 % | RESPIRATION RATE: 18 BRPM

## 2025-02-18 DIAGNOSIS — I48.3 TYPICAL ATRIAL FLUTTER (MULTI): ICD-10-CM

## 2025-02-18 DIAGNOSIS — R73.01 IFG (IMPAIRED FASTING GLUCOSE): Primary | ICD-10-CM

## 2025-02-18 DIAGNOSIS — E03.9 HYPOTHYROIDISM, UNSPECIFIED TYPE: ICD-10-CM

## 2025-02-18 PROCEDURE — 99213 OFFICE O/P EST LOW 20 MIN: CPT | Performed by: NURSE PRACTITIONER

## 2025-02-18 PROCEDURE — 4004F PT TOBACCO SCREEN RCVD TLK: CPT | Performed by: NURSE PRACTITIONER

## 2025-02-18 PROCEDURE — 3008F BODY MASS INDEX DOCD: CPT | Performed by: NURSE PRACTITIONER

## 2025-02-18 RX ORDER — LEVOTHYROXINE SODIUM 50 UG/1
50 TABLET ORAL DAILY
Qty: 90 TABLET | Refills: 1 | Status: SHIPPED | OUTPATIENT
Start: 2025-02-18

## 2025-02-18 ASSESSMENT — ENCOUNTER SYMPTOMS
CONSTITUTIONAL NEGATIVE: 1
ACTIVITY CHANGE: 0
SLEEP DISTURBANCE: 0
NERVOUS/ANXIOUS: 0
HEADACHES: 0
COUGH: 0
CONFUSION: 0
WEAKNESS: 0
ABDOMINAL PAIN: 0
FEVER: 0
PALPITATIONS: 0
NAUSEA: 0
APNEA: 0
ARTHRALGIAS: 0
SPEECH DIFFICULTY: 0
SHORTNESS OF BREATH: 0
VOMITING: 0
CHILLS: 0
SORE THROAT: 0
MYALGIAS: 0
DIZZINESS: 0

## 2025-02-18 NOTE — PROGRESS NOTES
Subjective   Patient ID: Sheri Guzmán is a 24 y.o. female who presents for Palpitations and Atrial Flutter.    Typical atrial flutter with palpitations: Patient seen last 1/17/2025 due to palpitations.  EKG done in office which showed atrial flutter.  Discussed with cardiology/Anthony.  Recommended echocardiogram and Holter monitor.  Holter results reviewed with patient on today-normal  Echocardiogram normal with LVEF 60%-normal.  Patient has remained off work however discussed returning to work after consult with cardiology 2/25/2025.  Reports that in the last week she has not experienced any chest tightness, shortness of breath or palpitations.    History of thyroid disorder: TSH normal.  Stable on levothyroxine 50 mcg daily    -CBC and Auto Differential:   Collection Time: 01/07/25 11:29 AM       Result                      Value             Ref Range           WBC                         5.8               4.4 - 11.3 x*       nRBC                        0.0               0.0 - 0.0 /1*       RBC                         4.98              4.00 - 5.20 *       Hemoglobin                  14.4              12.0 - 16.0 *       Hematocrit                  43.9              36.0 - 46.0 %       MCV                         88                80 - 100 fL         MCH                         28.9              26.0 - 34.0 *       MCHC                        32.8              32.0 - 36.0 *       RDW                         12.7              11.5 - 14.5 %       Platelets                   205               150 - 450 x1*       Neutrophils %               66.8              40.0 - 80.0 %       Immature Granulocytes *     0.2               0.0 - 0.9 %         Lymphocytes %               23.8              13.0 - 44.0 %       Monocytes %                 5.9               2.0 - 10.0 %        Eosinophils %               2.6               0.0 - 6.0 %         Basophils %                 0.7               0.0 - 2.0 %         Neutrophils  Absolute        3.87              1.20 - 7.70 *       Immature Granulocytes *     0.01              0.00 - 0.70 *       Lymphocytes Absolute        1.38              1.20 - 4.80 *       Monocytes Absolute          0.34              0.10 - 1.00 *       Eosinophils Absolute        0.15              0.00 - 0.70 *       Basophils Absolute          0.04              0.00 - 0.10 *  -Comprehensive Metabolic Panel:   Collection Time: 01/07/25 11:29 AM       Result                      Value             Ref Range           Glucose                     92                74 - 99 mg/dL       Sodium                      137               136 - 145 mm*       Potassium                   4.0               3.5 - 5.3 mm*       Chloride                    105               98 - 107 mmo*       Bicarbonate                 27                21 - 32 mmol*       Anion Gap                   9 (L)             10 - 20 mmol*       Urea Nitrogen               9                 6 - 23 mg/dL        Creatinine                  0.75              0.50 - 1.05 *       eGFR                        >90               >60 mL/min/1*       Calcium                     9.5               8.6 - 10.3 m*       Albumin                     4.6               3.4 - 5.0 g/*       Alkaline Phosphatase        58                33 - 110 U/L        Total Protein               6.9               6.4 - 8.2 g/*       AST                         19                9 - 39 U/L          Bilirubin, Total            0.6               0.0 - 1.2 mg*       ALT                         17                7 - 45 U/L     -TSH with reflex to Free T4 if abnormal:   Collection Time: 01/07/25 11:29 AM       Result                      Value             Ref Range           Thyroid Stimulating Ho*     2.79              0.44 - 3.98 *  -Vitamin B12:   Collection Time: 01/07/25 11:29 AM       Result                      Value             Ref Range           Vitamin B12                 596                211 - 911 pg*  -Lipid Panel:   Collection Time: 01/07/25 11:29 AM       Result                      Value             Ref Range           Cholesterol                 127               0 - 199 mg/dL       HDL-Cholesterol             68.7              mg/dL               Cholesterol/HDL Ratio       1.8                                   LDL Calculated              44                <=119 mg/dL         VLDL                        14                0 - 40 mg/dL        Triglycerides               72                0 - 149 mg/dL       Non HDL Cholesterol         58                0 - 149 mg/dL  -Iron and TIBC:   Collection Time: 01/07/25 11:29 AM       Result                      Value             Ref Range           Iron                        140               35 - 150 ug/*       UIBC                        204               110 - 370 ug*       TIBC                        344               240 - 445 ug*       % Saturation                41                25 - 45 %      -Ferritin:   Collection Time: 01/07/25 11:29 AM       Result                      Value             Ref Range           Ferritin                    77                8 - 150 ng/mL  -Transthoracic Echo (TTE) Complete:   Collection Time: 01/13/25  7:30 AM       Result                      Value             Ref Range           AV pk chacha                   1.26              m/s                 LVOT diam                   1.93              cm                  MV E/A ratio                1.64                                  LA vol index A/L            26.2              ml/m2               Tricuspid annular plan*     2.0               cm                  LV EF                       60                %                   RV free wall pk S'          13.00             cm/s                LVIDd                       4.90              cm                  RVSP                        22.6              mmHg                Aortic Valve Area by C*     2.40              cm2  "                AV pk grad                  6                 mmHg                LV A4C EF                   67.3                                Palpitations   Pertinent negatives include no anxiety, chest pain, coughing, dizziness, fever, nausea, shortness of breath, vomiting or weakness.   Chest Pain   Pertinent negatives include no abdominal pain, cough, dizziness, fever, headaches, nausea, palpitations, shortness of breath, vomiting or weakness.        Review of Systems   Constitutional: Negative.  Negative for activity change, chills and fever.   HENT:  Negative for congestion, postnasal drip, sneezing and sore throat.    Respiratory:  Negative for apnea, cough and shortness of breath.    Cardiovascular:  Negative for chest pain and palpitations.   Gastrointestinal:  Negative for abdominal pain, nausea and vomiting.   Musculoskeletal:  Negative for arthralgias and myalgias.   Neurological:  Negative for dizziness, syncope, speech difficulty, weakness and headaches.   Psychiatric/Behavioral:  Negative for confusion and sleep disturbance. The patient is not nervous/anxious.        Objective   /80   Pulse 85   Temp 36.3 °C (97.4 °F) (Temporal)   Resp 18   Ht 1.626 m (5' 4\")   Wt 69.5 kg (153 lb 3.2 oz)   SpO2 99%   BMI 26.30 kg/m²     Physical Exam  Vitals reviewed.   Constitutional:       Appearance: Normal appearance.   HENT:      Head: Normocephalic.   Cardiovascular:      Rate and Rhythm: Normal rate and regular rhythm.      Pulses: Normal pulses.      Heart sounds: Normal heart sounds.   Pulmonary:      Effort: Pulmonary effort is normal. No respiratory distress.      Breath sounds: Normal breath sounds. No wheezing.   Abdominal:      General: Bowel sounds are normal.   Neurological:      General: No focal deficit present.      Mental Status: She is alert and oriented to person, place, and time.   Psychiatric:         Mood and Affect: Mood normal.         Behavior: Behavior normal. "         Assessment/Plan   Problem List Items Addressed This Visit             ICD-10-CM    Hypothyroidism E03.9    Relevant Medications    levothyroxine (Synthroid, Levoxyl) 50 mcg tablet    Other Relevant Orders    Comprehensive Metabolic Panel    CBC and Auto Differential    Lipid Panel    TSH with reflex to Free T4 if abnormal    Typical atrial flutter (Multi) I48.3     Normal echocardiogram, normal Holter monitor  Keep follow-up appointment with cardiology 2/25/2025          Other Visit Diagnoses         Codes    IFG (impaired fasting glucose)    -  Primary R73.01    Relevant Orders    Hemoglobin A1C

## 2025-02-18 NOTE — ASSESSMENT & PLAN NOTE
Normal echocardiogram, normal Holter monitor  Keep follow-up appointment with cardiology 2/25/2025

## 2025-02-25 ENCOUNTER — OFFICE VISIT (OUTPATIENT)
Dept: CARDIOLOGY | Facility: CLINIC | Age: 25
End: 2025-02-25
Payer: MEDICAID

## 2025-02-25 VITALS
BODY MASS INDEX: 25.64 KG/M2 | SYSTOLIC BLOOD PRESSURE: 123 MMHG | HEIGHT: 65 IN | WEIGHT: 153.9 LBS | TEMPERATURE: 98.4 F | DIASTOLIC BLOOD PRESSURE: 75 MMHG | HEART RATE: 85 BPM

## 2025-02-25 DIAGNOSIS — R07.9 CHEST PAIN, UNSPECIFIED TYPE: ICD-10-CM

## 2025-02-25 DIAGNOSIS — R94.31 ABNORMAL EKG: Primary | ICD-10-CM

## 2025-02-25 DIAGNOSIS — R55 NEAR SYNCOPE: ICD-10-CM

## 2025-02-25 DIAGNOSIS — R00.2 PALPITATIONS: ICD-10-CM

## 2025-02-25 PROCEDURE — 4004F PT TOBACCO SCREEN RCVD TLK: CPT | Performed by: NURSE PRACTITIONER

## 2025-02-25 PROCEDURE — 99215 OFFICE O/P EST HI 40 MIN: CPT | Performed by: NURSE PRACTITIONER

## 2025-02-25 PROCEDURE — 3008F BODY MASS INDEX DOCD: CPT | Performed by: NURSE PRACTITIONER

## 2025-02-25 PROCEDURE — 99205 OFFICE O/P NEW HI 60 MIN: CPT | Performed by: NURSE PRACTITIONER

## 2025-02-25 NOTE — PROGRESS NOTES
Chief Complaint:   Abnormal EKG      History Of Present Illness:    Sheri Guzmán is a 24 y.o. female here with abnormal EKG.  Presented to PCP, on 1/7/2025, with chest pain and SOB. PCP performed EKG, EKG read aflutter. Holter and TTE ordered. She is here for evaluation.     TTE 1/13/2025   1. The left ventricular systolic function is normal, with a Reed's biplane calculated ejection fraction of 60%.   2. There is normal right ventricular global systolic function.     Holter: Normal, no afib/flutter    She discussed with PCP ongoing chest pain. It is variable. It can be an epigastric stabbing or left sided pain or all throughout the chest. It occurs with and without activity. Does not correlate with near syncope.     Since she was young she has been having intermittent dizzy episodes. She remembers that when she would wash dishes she would develop tunnel vision and vision would go black.     This is occurring, now, if she stands for long periods of time or every time she climbs stairs. Can occur with sitting. She developed this during orthostatics in office today.    She has not been syncopal.     She reports that her symptoms will intensify for a month then improve, never resolve, for several months.     Whole month of January unable even do things around the house due to near syncope.     Gut issues as teen.   Hx: PCOS and IBS CPTSD, ADHD, Depression, Bipolar     Water: 2 glasses a day  Caffeine: has been cutting down. On avg 1-2 cups regular coffee per day   ETOH: occasional   Marijuana: Daily smoker, no change in symptoms   Vape nicotine     Family Hx  Mom: don't know   Dad: don't know     Past Medical History:  She has a past medical history of Anxiety, Bipolar disorder, unspecified (Multi), Depression, Disease of thyroid gland, Eczema (As a child), GERD (gastroesophageal reflux disease) (As a child), Headache (During high school), Hypothyroidism, Inflammatory bowel disease (During middle school),  "Migraine, Personal history of other mental and behavioral disorders, Personal history of other mental and behavioral disorders, Polycystic ovary syndrome, and Unspecified speech disturbances.    Past Surgical History:  She has a past surgical history that includes [place] tooth extraction (2019).      Social History:  She reports that she has been smoking. She does not have any smokeless tobacco history on file. She reports current alcohol use of about 3.0 standard drinks of alcohol per week. She reports current drug use. Frequency: 7.00 times per week. Drug: Marijuana.    Family History:  Family History   Problem Relation Name Age of Onset    Alcohol abuse Mother Marlon Guzmán     Miscarriages / Stillbirths Mother Marlon Guzmán     Diabetes Maternal Grandmother Stephany Ramirez     Hypothyroidism Maternal Grandmother Stephany Ramirez     Multiple sclerosis Maternal Grandmother Stephany Ramirez     Mental illness Maternal Grandmother Stephany Ramirez     Miscarriages / Stillbirths Maternal Grandmother Stephany Ramirez     Vision loss Maternal Grandmother Stephany Ramirez     Other (atrial flutter) Maternal Grandfather      Other (Cardiac Disorder) Paternal Grandmother      Other (Cardiac Disorder) Paternal Grandfather Neil linares     Diabetes Paternal Grandfather Neil linares     Diabetes Other          Multiple Family Members    Hypothyroidism Other      Breast cancer Other Maternal Relatives     Prostate cancer Other Maternal Relatives       Allergies:  Patient has no known allergies.    Review of Systems  All pertinent systems have been reviewed and are negative except for what is stated in the history of present illness.    All other systems have been reviewed and are negative and noncontributory to this patient's current ailments.     Visit Vitals  /75 (BP Location: Right arm, Patient Position: Sitting, BP Cuff Size: Adult)   Pulse 85   Temp 36.9 °C (98.4 °F) (Temporal)   Ht 1.651 m (5' 5\")   Wt 69.8 kg (153 lb 14.4 oz)   BMI 25.61 " kg/m²   OB Status Having periods   Smoking Status Every Day   BSA 1.79 m²       Last Labs:  CBC -  Lab Results   Component Value Date    WBC 5.8 01/07/2025    HGB 14.4 01/07/2025    HCT 43.9 01/07/2025    MCV 88 01/07/2025     01/07/2025       CMP -  Lab Results   Component Value Date    CALCIUM 9.5 01/07/2025    PROT 6.9 01/07/2025    ALBUMIN 4.6 01/07/2025    AST 19 01/07/2025    ALT 17 01/07/2025    ALKPHOS 58 01/07/2025    BILITOT 0.6 01/07/2025    BUN 9 01/07/2025    CREATININE 0.75 01/07/2025       LIPID PANEL -   Lab Results   Component Value Date    CHOL 127 01/07/2025    TRIG 72 01/07/2025    HDL 68.7 01/07/2025    CHHDL 1.8 01/07/2025    LDLF 60 07/14/2022    VLDL 14 01/07/2025    NHDL 58 01/07/2025       RENAL FUNCTION PANEL -   Lab Results   Component Value Date    GLUCOSE 92 01/07/2025     01/07/2025    K 4.0 01/07/2025     01/07/2025    CO2 27 01/07/2025    ANIONGAP 9 (L) 01/07/2025    BUN 9 01/07/2025    CREATININE 0.75 01/07/2025    CALCIUM 9.5 01/07/2025    ALBUMIN 4.6 01/07/2025        Lab Results   Component Value Date    HGBA1C 5.2 07/14/2022    HGBA1C 5.0 12/06/2021       Objective   Vitals reviewed.   Constitutional:       Appearance: Healthy appearance. Not in distress.   Eyes:      Conjunctiva/sclera: Conjunctivae normal.   Neck:      Vascular: No JVR. JVD normal.   Pulmonary:      Effort: Pulmonary effort is normal.      Breath sounds: Normal breath sounds. No wheezing. No rhonchi. No rales.   Chest:      Chest wall: Not tender to palpatation.   Cardiovascular:      PMI at left midclavicular line. Normal rate. Regular rhythm. Normal S1. Normal S2.       Murmurs: There is no murmur.      No gallop.  No click. No rub.   Edema:     Peripheral edema absent.   Abdominal:      General: Bowel sounds are normal.      Palpations: Abdomen is soft.      Tenderness: There is no abdominal tenderness.   Musculoskeletal: Normal range of motion.         General: No tenderness. Skin:      General: Skin is warm and dry.   Neurological:      General: No focal deficit present.      Mental Status: Alert and oriented to person, place and time.   Psychiatric:         Attention and Perception: Attention normal.         Mood and Affect: Mood normal.       Assessment/Plan   Diagnoses and all orders for this visit:  Abnormal EKG  - holter negative for afib/flutter  - EKG and rate inconsistent with aflutter   - holter normal   Chest pain, unspecified type  - atypical  - nothing untoward on TTE  - low risk for CAD  - no ischemic changes on EKG  - suspect 2/2 anxiety and vaping  - she will work to stop vaping   Palpitations  - 2/2 near syncope with exertion   - nothing untoward on holter  Near syncope  - orthos negative for POTS, HR increased by 28bpm  - bp did decrease  - asked her to increase hydration  - add at least 2 electrolyte drinks per day  - increase sodium intake  - 6 small high protein meals per day  - compression socks/leggings     Follow up 3-4 weeks     Outpatient Medications:  Current Outpatient Medications   Medication Instructions    buPROPion XL (WELLBUTRIN XL) 150 mg, Daily before breakfast    levonorgestrel (Mirena) 21 mcg/24 hr (8 yrs) 52 mg IUD by intrauterine route.    levothyroxine (SYNTHROID, LEVOXYL) 50 mcg, oral, Daily    multivitamin tablet 1 tablet, Daily    OXcarbazepine (TRILEPTAL) 150 mg, 2 times daily     Exclusive of any other services or procedures performed, Michelle NELSON, spent 60 minutes in duration for this visit today.  This time consisted of chart review, obtaining history, and/or performing the exam as documented above, as well as, documenting the clinical information for the encounter in the electronic record, discussing treatment options, plans, and/or goals with patient, family, and/or caregiver, refilling medications, updating the electronic record, ordering medicines, lab work, imaging, referrals, and/or procedures as documented above and communicating  with other [facility] professionals. I have discussed the results of laboratory, radiology, and cardiology studies with the patient and their family/caregiver.

## 2025-02-25 NOTE — PATIENT INSTRUCTIONS
The Body Keep Score     Somatic Exercises:   The Work out [employer]     [facility] (consider massotherapy)   Address: [address], [place] [id number]  Phone: [phone]    Increase fluids   At least 2 electrolyte drinks a day (LMNT, sugar powerade)   [email or web address]   Increase sodium intake   Increase protein and salt, 6 small meals a day   Compression socks and/or leggings     If exercise and/or work electrolyte an hour before and compression socks/leggings

## 2025-03-25 ENCOUNTER — OFFICE VISIT (OUTPATIENT)
Dept: CARDIOLOGY | Facility: CLINIC | Age: 25
End: 2025-03-25
Payer: MEDICAID

## 2025-03-25 VITALS
HEART RATE: 87 BPM | SYSTOLIC BLOOD PRESSURE: 106 MMHG | BODY MASS INDEX: 25.46 KG/M2 | WEIGHT: 153 LBS | DIASTOLIC BLOOD PRESSURE: 66 MMHG

## 2025-03-25 DIAGNOSIS — R94.31 ABNORMAL EKG: Primary | ICD-10-CM

## 2025-03-25 DIAGNOSIS — R07.9 CHEST PAIN, UNSPECIFIED TYPE: ICD-10-CM

## 2025-03-25 DIAGNOSIS — R55 NEAR SYNCOPE: ICD-10-CM

## 2025-03-25 DIAGNOSIS — R00.2 PALPITATIONS: ICD-10-CM

## 2025-03-25 PROCEDURE — 4004F PT TOBACCO SCREEN RCVD TLK: CPT | Performed by: NURSE PRACTITIONER

## 2025-03-25 PROCEDURE — 99214 OFFICE O/P EST MOD 30 MIN: CPT | Performed by: NURSE PRACTITIONER

## 2025-03-25 RX ORDER — SODIUM CHLORIDE 1000 MG
1 TABLET, SOLUBLE MISCELLANEOUS 3 TIMES DAILY
Qty: 270 TABLET | Refills: 3 | Status: SHIPPED | OUTPATIENT
Start: 2025-03-25 | End: 2026-03-25

## 2025-03-25 NOTE — PROGRESS NOTES
Chief Complaint:   Abnormal EKG      History Of Present Illness:    Sheri Guzmán is a 24 y.o. female here with abnormal EKG.  Presented to PCP, on 1/7/2025, with chest pain and SOB. PCP performed EKG, EKG read aflutter. Holter and TTE ordered.    Since last visit she is doing better.   She has been increasing dietary sodium and protein.     Has been attempting to drink more water. Trying to drink 2-3 16 ounce bottles of water. Has been adding propel electrolyte packets 2x days.     Having issues stopping vaping. Has been cutting it down.     Still getting chest pain, not as often and less intense.     Dizzy spells several times a week.     Has been sleeping better.     TTE 1/13/2025   1. The left ventricular systolic function is normal, with a Reed's biplane calculated ejection fraction of 60%.   2. There is normal right ventricular global systolic function.     Holter: Normal, no afib/flutter       Past Medical History:  She has a past medical history of Anxiety, Bipolar disorder, unspecified (Multi), Depression, Disease of thyroid gland, Eczema (As a child), GERD (gastroesophageal reflux disease) (As a child), Headache (During high school), Hypothyroidism, Inflammatory bowel disease (During middle school), Migraine, Personal history of other mental and behavioral disorders, Personal history of other mental and behavioral disorders, Polycystic ovary syndrome, and Unspecified speech disturbances.    Past Surgical History:  She has a past surgical history that includes [place] tooth extraction (2019).      Social History:  She reports that she has been smoking. She does not have any smokeless tobacco history on file. She reports current alcohol use of about 3.0 standard drinks of alcohol per week. She reports current drug use. Frequency: 7.00 times per week. Drug: Marijuana.    Family History:  Family History   Problem Relation Name Age of Onset    Alcohol abuse Mother Marlon Guzmán     Miscarriages /  Stillbirths Mother Marlon Ramirez     Diabetes Maternal Grandmother Stephany Ramirez     Hypothyroidism Maternal Grandmother Stephany Ramirez     Multiple sclerosis Maternal Grandmother Stephany Ramirez     Mental illness Maternal Grandmother Stephany Ramirez     Miscarriages / Stillbirths Maternal Grandmother Stephayn Ramirez     Vision loss Maternal Grandmother Stephany Ramirez     Other (atrial flutter) Maternal Grandfather      Other (Cardiac Disorder) Paternal Grandmother      Other (Cardiac Disorder) Paternal Grandfather Neil linares     Diabetes Paternal Grandfather Neil linares     Diabetes Other          Multiple Family Members    Hypothyroidism Other      Breast cancer Other Maternal Relatives     Prostate cancer Other Maternal Relatives       Allergies:  Patient has no known allergies.    Review of Systems  All pertinent systems have been reviewed and are negative except for what is stated in the history of present illness.    All other systems have been reviewed and are negative and noncontributory to this patient's current ailments.     Visit Vitals  /66 (BP Location: Right arm, Patient Position: Sitting, BP Cuff Size: Adult)   Pulse 87   Wt 69.4 kg (153 lb)   BMI 25.46 kg/m²   OB Status Having periods   Smoking Status Every Day   BSA 1.78 m²       Last Labs:  CBC -  Lab Results   Component Value Date    WBC 5.8 01/07/2025    HGB 14.4 01/07/2025    HCT 43.9 01/07/2025    MCV 88 01/07/2025     01/07/2025       CMP -  Lab Results   Component Value Date    CALCIUM 9.5 01/07/2025    PROT 6.9 01/07/2025    ALBUMIN 4.6 01/07/2025    AST 19 01/07/2025    ALT 17 01/07/2025    ALKPHOS 58 01/07/2025    BILITOT 0.6 01/07/2025    BUN 9 01/07/2025    CREATININE 0.75 01/07/2025       LIPID PANEL -   Lab Results   Component Value Date    CHOL 127 01/07/2025    TRIG 72 01/07/2025    HDL 68.7 01/07/2025    CHHDL 1.8 01/07/2025    LDLF 60 07/14/2022    VLDL 14 01/07/2025    NHDL 58 01/07/2025       RENAL FUNCTION PANEL -   Lab Results    Component Value Date    GLUCOSE 92 01/07/2025     01/07/2025    K 4.0 01/07/2025     01/07/2025    CO2 27 01/07/2025    ANIONGAP 9 (L) 01/07/2025    BUN 9 01/07/2025    CREATININE 0.75 01/07/2025    CALCIUM 9.5 01/07/2025    ALBUMIN 4.6 01/07/2025        Lab Results   Component Value Date    HGBA1C 5.2 07/14/2022    HGBA1C 5.0 12/06/2021       Objective   Vitals reviewed.   Constitutional:       Appearance: Healthy appearance. Not in distress.   Eyes:      Conjunctiva/sclera: Conjunctivae normal.   Neck:      Vascular: No JVR. JVD normal.   Pulmonary:      Effort: Pulmonary effort is normal.      Breath sounds: Normal breath sounds. No wheezing. No rhonchi. No rales.   Chest:      Chest wall: Not tender to palpatation.   Cardiovascular:      PMI at left midclavicular line. Normal rate. Regular rhythm. Normal S1. Normal S2.       Murmurs: There is no murmur.      No gallop.  No click. No rub.   Edema:     Peripheral edema absent.   Abdominal:      General: Bowel sounds are normal.      Palpations: Abdomen is soft.      Tenderness: There is no abdominal tenderness.   Musculoskeletal: Normal range of motion.         General: No tenderness. Skin:     General: Skin is warm and dry.   Neurological:      General: No focal deficit present.      Mental Status: Alert and oriented to person, place and time.   Psychiatric:         Attention and Perception: Attention normal.         Mood and Affect: Mood normal.       Assessment/Plan   Diagnoses and all orders for this visit:  Abnormal EKG  - holter negative for afib/flutter  - EKG and rate inconsistent with aflutter   - holter normal   Chest pain, unspecified type  - atypical  - nothing untoward on TTE  - low risk for CAD  - no ischemic changes on EKG  - suspect 2/2 anxiety and vaping  - she is working to stop vaping  - improving, not as intense    Palpitations  - nothing untoward on holter  - improved   - continue hydration and electrolytes   Near syncope  -  orthos negative for POTS, HR increased by 28bpm  - bp did decrease  - continue increase hydration  - 2 electrolyte drinks per day  - increase sodium intake, salt tablets ordered   - 6 small high protein meals per day  - compression socks/leggings     Follow up in 4 months     Outpatient Medications:  Current Outpatient Medications   Medication Instructions    buPROPion XL (WELLBUTRIN XL) 150 mg, Daily before breakfast    levonorgestrel (Mirena) 21 mcg/24 hr (8 yrs) 52 mg IUD by intrauterine route.    levothyroxine (SYNTHROID, LEVOXYL) 50 mcg, oral, Daily    multivitamin tablet 1 tablet, Daily    OXcarbazepine (TRILEPTAL) 150 mg, 2 times daily     Exclusive of any other services or procedures performed, I, Michelle RODRIGUEZ, spent 30 minutes in duration for this visit today.  This time consisted of chart review, obtaining history, and/or performing the exam as documented above, as well as, documenting the clinical information for the encounter in the electronic record, discussing treatment options, plans, and/or goals with patient, family, and/or caregiver, refilling medications, updating the electronic record, ordering medicines, lab work, imaging, referrals, and/or procedures as documented above and communicating with other [facility] professionals. I have discussed the results of laboratory, radiology, and cardiology studies with the patient and their family/caregiver.

## 2025-05-22 ENCOUNTER — EVALUATION (OUTPATIENT)
Dept: OCCUPATIONAL THERAPY | Facility: HOSPITAL | Age: 25
End: 2025-05-22
Payer: MEDICAID

## 2025-05-22 DIAGNOSIS — R07.89 CHEST TIGHTNESS: ICD-10-CM

## 2025-05-22 DIAGNOSIS — I48.3 TYPICAL ATRIAL FLUTTER (MULTI): Primary | ICD-10-CM

## 2025-05-22 PROCEDURE — 97750 PHYSICAL PERFORMANCE TEST: CPT | Mod: GO | Performed by: OCCUPATIONAL THERAPIST

## 2025-05-22 NOTE — PROGRESS NOTES
Occupational Therapy    Evaluation    Patient Name: Sheri Guzmán  MRN: [id number]  Today's Date: 5/22/2025      Time Calculation  Start Time: 1300  Stop Time: 1525  Time Calculation (min): 145 min  OT Evaluation Time Entry  Physical Performance Test (with Report) Time Entry: 145      Visit# 1    Assessment:  FCE completed.      Plan:   OT Plan: IE/DC FCE Eval Only  Rehab Potential: Good  Plan of Care Agreement: Patient    Subjective   Current Problem:  1. Typical atrial flutter (Multi)  Referral to Occupational Therapy      2. Chest tightness  Referral to Occupational Therapy        General:  General  Reason for Referral: Functional Capacity Evaluation completed this date.  General Comment: See details in [employer] report.    About one year ago bruce to the ED for dizziness, trouble breathing and tunnel vision.  Had difficulty doing her job.  Seh currently tooka job at [employer].  Precautions:  Often feels like she may pass out     Pain:  Muscle aches, 3/10     Objective   See functional capacity report for details.    OP EDUCATION:  Education  Individual(s) Educated: Patient  Risk and Benefits Discussed with Patient/Caregiver/Other: yes  Patient/Caregiver Demonstrated Understanding: yes  Plan of Care Discussed and Agreed Upon: yes    Goals:  IE/DC no goals established.

## 2025-06-10 ENCOUNTER — TELEPHONE (OUTPATIENT)
Dept: PRIMARY CARE | Facility: CLINIC | Age: 25
End: 2025-06-10
Payer: MEDICAID

## 2025-06-10 NOTE — TELEPHONE ENCOUNTER
I called the patient because we had received her functional capacity evaluation completed on 5/22/25. She was to return to work on 2/25/25. The patient states she did not return to work and was ultimately let go because she was unable to perform her job duties. She states her symptoms have improved since seeing cardiology in March. Reports the episodes of shortness of breath and chest pain are less frequent. The patient stated she is going to look for a new job that is less physically demanding. No further paperwork needs to be completed for her employment.

## 2025-07-23 ENCOUNTER — OFFICE VISIT (OUTPATIENT)
Dept: CARDIOLOGY | Facility: CLINIC | Age: 25
End: 2025-07-23
Payer: MEDICAID

## 2025-07-23 VITALS
HEART RATE: 80 BPM | SYSTOLIC BLOOD PRESSURE: 100 MMHG | BODY MASS INDEX: 25.96 KG/M2 | WEIGHT: 156 LBS | DIASTOLIC BLOOD PRESSURE: 64 MMHG

## 2025-07-23 DIAGNOSIS — R42 DIZZINESS: ICD-10-CM

## 2025-07-23 DIAGNOSIS — R94.31 ABNORMAL EKG: Primary | ICD-10-CM

## 2025-07-23 DIAGNOSIS — R55 NEAR SYNCOPE: ICD-10-CM

## 2025-07-23 DIAGNOSIS — R07.9 CHEST PAIN, UNSPECIFIED TYPE: ICD-10-CM

## 2025-07-23 DIAGNOSIS — R00.2 PALPITATIONS: ICD-10-CM

## 2025-07-23 PROCEDURE — 99212 OFFICE O/P EST SF 10 MIN: CPT

## 2025-07-23 PROCEDURE — 99214 OFFICE O/P EST MOD 30 MIN: CPT | Performed by: NURSE PRACTITIONER

## 2025-07-23 NOTE — PROGRESS NOTES
Chief Complaint:   Abnormal EKG      History Of Present Illness:    Sheri Guzmán is a 24 y.o. female here with abnormal EKG.Presented to PCP, on 1/7/2025, with chest pain, dizziness and SOB. PCP performed EKG, EKG read aflutter. Holter and TTE ordered. Holter negative for afib/flutter.     Chest pain has resolved since last visit.     Will get dizzy but not as severe.     Notes she becomes SOB climbing the stairs, more so that what she should. It resolves after resting for a minute.     Denies syncope.     Does note a lot of hot flashes.     Very lethargic. Sleeping most of the day. Reports she is having difficulty sleeping at night.     Has been adding more salt in diet. Vomiting up sodium tablets. Notes she is nauseated a lot of the time.        TTE 1/13/2025   1. The left ventricular systolic function is normal, with a Reed's biplane calculated ejection fraction of 60%.   2. There is normal right ventricular global systolic function.     Holter: Normal, no afib/flutter       Past Medical History:  She has a past medical history of Anxiety, Bipolar disorder, unspecified (Multi), Depression, Disease of thyroid gland, Eczema (As a child), GERD (gastroesophageal reflux disease) (As a child), Headache (During high school), Hypothyroidism, Inflammatory bowel disease (During middle school), Migraine, Personal history of other mental and behavioral disorders, Personal history of other mental and behavioral disorders, Polycystic ovary syndrome, and Unspecified speech disturbances.    Past Surgical History:  She has a past surgical history that includes [place] tooth extraction (2019).      Social History:  She reports that she has been smoking. She does not have any smokeless tobacco history on file. She reports current alcohol use of about 3.0 standard drinks of alcohol per week. She reports current drug use. Frequency: 7.00 times per week. Drug: Marijuana.    Family History:  Family History   Problem Relation Name  Age of Onset    Alcohol abuse Mother Marlon Ramirez     Miscarriages / Stillbirths Mother Marlon Ramirez     Diabetes Maternal Grandmother Stephany Ramirez     Hypothyroidism Maternal Grandmother Stephany Ramirez     Multiple sclerosis Maternal Grandmother Stephany Ramirez     Mental illness Maternal Grandmother Stephany Ramirez     Miscarriages / Stillbirths Maternal Grandmother Stephany Ramirez     Vision loss Maternal Grandmother Stephany Ramirez     Other (atrial flutter) Maternal Grandfather      Other (Cardiac Disorder) Paternal Grandmother      Other (Cardiac Disorder) Paternal Grandfather Neil linares     Diabetes Paternal Grandfather Neil linares     Diabetes Other          Multiple Family Members    Hypothyroidism Other      Breast cancer Other Maternal Relatives     Prostate cancer Other Maternal Relatives       Allergies:  Patient has no known allergies.    Review of Systems  All pertinent systems have been reviewed and are negative except for what is stated in the history of present illness.    All other systems have been reviewed and are negative and noncontributory to this patient's current ailments.     Visit Vitals  /64 (BP Location: Right arm, Patient Position: Sitting, BP Cuff Size: Adult)   Pulse 80   Wt 70.8 kg (156 lb)   BMI 25.96 kg/m²   OB Status Having periods   Smoking Status Every Day   BSA 1.8 m²       Last Labs:  CBC -  Lab Results   Component Value Date    WBC 5.8 01/07/2025    HGB 14.4 01/07/2025    HCT 43.9 01/07/2025    MCV 88 01/07/2025     01/07/2025       CMP -  Lab Results   Component Value Date    CALCIUM 9.5 01/07/2025    PROT 6.9 01/07/2025    ALBUMIN 4.6 01/07/2025    AST 19 01/07/2025    ALT 17 01/07/2025    ALKPHOS 58 01/07/2025    BILITOT 0.6 01/07/2025    BUN 9 01/07/2025    CREATININE 0.75 01/07/2025       LIPID PANEL -   Lab Results   Component Value Date    CHOL 127 01/07/2025    TRIG 72 01/07/2025    HDL 68.7 01/07/2025    CHHDL 1.8 01/07/2025    LDLF 60 07/14/2022    VLDL 14  01/07/2025    NHDL 58 01/07/2025       RENAL FUNCTION PANEL -   Lab Results   Component Value Date    GLUCOSE 92 01/07/2025     01/07/2025    K 4.0 01/07/2025     01/07/2025    CO2 27 01/07/2025    ANIONGAP 9 (L) 01/07/2025    BUN 9 01/07/2025    CREATININE 0.75 01/07/2025    CALCIUM 9.5 01/07/2025    ALBUMIN 4.6 01/07/2025        Lab Results   Component Value Date    HGBA1C 5.2 07/14/2022    HGBA1C 5.0 12/06/2021       Objective   Vitals reviewed.   Constitutional:       Appearance: Healthy appearance. Not in distress.   Eyes:      Conjunctiva/sclera: Conjunctivae normal.   Neck:      Vascular: No JVR. JVD normal.   Pulmonary:      Effort: Pulmonary effort is normal.      Breath sounds: Normal breath sounds. No wheezing. No rhonchi. No rales.   Chest:      Chest wall: Not tender to palpatation.   Cardiovascular:      PMI at left midclavicular line. Normal rate. Regular rhythm. Normal S1. Normal S2.       Murmurs: There is no murmur.      No gallop.  No click. No rub.   Edema:     Peripheral edema absent.   Abdominal:      General: Bowel sounds are normal.      Palpations: Abdomen is soft.      Tenderness: There is no abdominal tenderness.   Musculoskeletal: Normal range of motion.         General: No tenderness. Skin:     General: Skin is warm and dry.   Neurological:      General: No focal deficit present.      Mental Status: Alert and oriented to person, place and time.   Psychiatric:         Attention and Perception: Attention normal.         Mood and Affect: Mood normal.     Assessment/Plan   Diagnoses and all orders for this visit:  Abnormal EKG  - holter negative for afib/flutter  - EKG and rate inconsistent with aflutter   - holter normal   Chest pain, unspecified type  - resolved   Palpitations  - nothing untoward on holter  - improved   - continue hydration and electrolytes   Near syncope/Dizziness   - improved   - continue hydration  - 2 electrolyte drinks per day  - salt tablets causing  nausea and vomiting, she will trial 1/2 tablet and see if her stomach tolerates. Has been increasing the sodium in her diet. Can try vitassium electrolyte chews   - compression socks/leggings     Follow up in 6 months     Outpatient Medications:  Current Outpatient Medications   Medication Instructions    buPROPion XL (WELLBUTRIN XL) 150 mg, Daily before breakfast    levonorgestrel (Mirena) 21 mcg/24 hr (8 yrs) 52 mg IUD by intrauterine route.    levothyroxine (SYNTHROID, LEVOXYL) 50 mcg, oral, Daily    multivitamin tablet 1 tablet, Daily    OXcarbazepine (TRILEPTAL) 150 mg, 2 times daily    sodium chloride 1 g, oral, 3 times daily     Exclusive of any other services or procedures performed, I, Michelle RODRIGUEZ, spent 20 minutes in duration for this visit today.  This time consisted of chart review, obtaining history, and/or performing the exam as documented above, as well as, documenting clinical information for the encounter in the electronic record       ,DirectAddress_Unknown

## 2026-02-19 ENCOUNTER — APPOINTMENT (OUTPATIENT)
Dept: PRIMARY CARE | Facility: CLINIC | Age: 26
End: 2026-02-19
Payer: MEDICAID